# Patient Record
Sex: FEMALE | Employment: OTHER | URBAN - METROPOLITAN AREA
[De-identification: names, ages, dates, MRNs, and addresses within clinical notes are randomized per-mention and may not be internally consistent; named-entity substitution may affect disease eponyms.]

---

## 2024-10-03 ENCOUNTER — APPOINTMENT (OUTPATIENT)
Dept: MRI IMAGING | Age: 59
DRG: 918 | End: 2024-10-03
Payer: MEDICARE

## 2024-10-03 ENCOUNTER — HOSPITAL ENCOUNTER (INPATIENT)
Age: 59
LOS: 5 days | Discharge: NURSING, REHAB OR LONG-TERM CARE | DRG: 918 | End: 2024-10-08
Attending: EMERGENCY MEDICINE | Admitting: HOSPITALIST
Payer: MEDICARE

## 2024-10-03 DIAGNOSIS — F41.9 ANXIETY: ICD-10-CM

## 2024-10-03 DIAGNOSIS — R73.9 HYPERGLYCEMIA: ICD-10-CM

## 2024-10-03 DIAGNOSIS — G93.41 ACUTE METABOLIC ENCEPHALOPATHY: Primary | ICD-10-CM

## 2024-10-03 DIAGNOSIS — R29.90 STROKE-LIKE SYMPTOMS: ICD-10-CM

## 2024-10-03 PROCEDURE — 87040 BLOOD CULTURE FOR BACTERIA: CPT

## 2024-10-03 PROCEDURE — 4A00X4Z MEASUREMENT OF CENTRAL NERVOUS ELECTRICAL ACTIVITY, EXTERNAL APPROACH: ICD-10-PCS | Performed by: STUDENT IN AN ORGANIZED HEALTH CARE EDUCATION/TRAINING PROGRAM

## 2024-10-03 PROCEDURE — 83605 ASSAY OF LACTIC ACID: CPT

## 2024-10-03 PROCEDURE — 85610 PROTHROMBIN TIME: CPT

## 2024-10-03 PROCEDURE — 84484 ASSAY OF TROPONIN QUANT: CPT

## 2024-10-03 PROCEDURE — 99285 EMERGENCY DEPT VISIT HI MDM: CPT

## 2024-10-03 PROCEDURE — 80143 DRUG ASSAY ACETAMINOPHEN: CPT

## 2024-10-03 PROCEDURE — 96374 THER/PROPH/DIAG INJ IV PUSH: CPT

## 2024-10-03 PROCEDURE — 85025 COMPLETE CBC W/AUTO DIFF WBC: CPT

## 2024-10-03 PROCEDURE — 95816 EEG AWAKE AND DROWSY: CPT | Performed by: STUDENT IN AN ORGANIZED HEALTH CARE EDUCATION/TRAINING PROGRAM

## 2024-10-03 PROCEDURE — 95816 EEG AWAKE AND DROWSY: CPT

## 2024-10-03 PROCEDURE — 87154 CUL TYP ID BLD PTHGN 6+ TRGT: CPT

## 2024-10-03 PROCEDURE — 70551 MRI BRAIN STEM W/O DYE: CPT

## 2024-10-03 PROCEDURE — 6360000004 HC RX CONTRAST MEDICATION: Performed by: EMERGENCY MEDICINE

## 2024-10-03 PROCEDURE — 81001 URINALYSIS AUTO W/SCOPE: CPT

## 2024-10-03 PROCEDURE — 80047 BASIC METABLC PNL IONIZED CA: CPT

## 2024-10-03 PROCEDURE — 80053 COMPREHEN METABOLIC PANEL: CPT

## 2024-10-03 PROCEDURE — 87205 SMEAR GRAM STAIN: CPT

## 2024-10-03 PROCEDURE — 1100000003 HC PRIVATE W/ TELEMETRY

## 2024-10-03 PROCEDURE — 82077 ASSAY SPEC XCP UR&BREATH IA: CPT

## 2024-10-03 PROCEDURE — 82962 GLUCOSE BLOOD TEST: CPT

## 2024-10-03 PROCEDURE — 80307 DRUG TEST PRSMV CHEM ANLYZR: CPT

## 2024-10-03 PROCEDURE — 84145 PROCALCITONIN (PCT): CPT

## 2024-10-03 PROCEDURE — 80179 DRUG ASSAY SALICYLATE: CPT

## 2024-10-03 RX ORDER — ASPIRIN 325 MG
TABLET ORAL
Status: DISPENSED
Start: 2024-10-03 | End: 2024-10-03

## 2024-10-03 RX ORDER — SODIUM CHLORIDE 9 MG/ML
INJECTION, SOLUTION INTRAMUSCULAR; INTRAVENOUS; SUBCUTANEOUS
Status: DISPENSED
Start: 2024-10-03 | End: 2024-10-04

## 2024-10-03 RX ORDER — CLOPIDOGREL BISULFATE 75 MG/1
TABLET ORAL
Status: DISPENSED
Start: 2024-10-03 | End: 2024-10-03

## 2024-10-03 RX ORDER — CEFTRIAXONE 1 G/1
INJECTION, POWDER, FOR SOLUTION INTRAMUSCULAR; INTRAVENOUS
Status: DISPENSED
Start: 2024-10-03 | End: 2024-10-04

## 2024-10-03 RX ADMIN — IOPAMIDOL 60 ML: 755 INJECTION, SOLUTION INTRAVENOUS at 10:15
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
## 2024-10-04 ENCOUNTER — APPOINTMENT (OUTPATIENT)
Dept: CT IMAGING | Age: 59
DRG: 918 | End: 2024-10-04
Payer: MEDICARE

## 2024-10-04 ENCOUNTER — APPOINTMENT (OUTPATIENT)
Dept: GENERAL RADIOLOGY | Age: 59
DRG: 918 | End: 2024-10-04
Payer: MEDICARE

## 2024-10-04 PROBLEM — I10 PRIMARY HYPERTENSION: Status: ACTIVE | Noted: 2024-10-04

## 2024-10-04 PROBLEM — E11.9 TYPE 2 DIABETES MELLITUS, WITHOUT LONG-TERM CURRENT USE OF INSULIN (HCC): Status: ACTIVE | Noted: 2024-10-04

## 2024-10-04 PROBLEM — F41.9 ANXIETY: Status: ACTIVE | Noted: 2024-10-04

## 2024-10-04 PROBLEM — F32.A DEPRESSION: Status: ACTIVE | Noted: 2024-10-04

## 2024-10-04 PROBLEM — E78.5 HYPERLIPIDEMIA: Status: ACTIVE | Noted: 2024-10-04

## 2024-10-04 LAB
ACB COMPLEX DNA BLD POS QL NAA+NON-PROBE: NOT DETECTED
ACCESSION NUMBER, LLC1M: ABNORMAL
ALBUMIN SERPL-MCNC: 3 G/DL (ref 3.2–4.6)
ALBUMIN/GLOB SERPL: 1 (ref 1–1.9)
ALP SERPL-CCNC: 100 U/L (ref 35–104)
ALT SERPL-CCNC: 35 U/L (ref 8–45)
AMPHET UR QL SCN: NEGATIVE
ANION GAP BLD CALC-SCNC: 10.4 MMOL/L
ANION GAP SERPL CALC-SCNC: 11 MMOL/L (ref 9–18)
ANION GAP SERPL CALC-SCNC: 11 MMOL/L (ref 9–18)
APPEARANCE UR: CLEAR
AST SERPL-CCNC: 116 U/L (ref 15–37)
B FRAGILIS DNA BLD POS QL NAA+NON-PROBE: NOT DETECTED
B PERT DNA SPEC QL NAA+PROBE: NOT DETECTED
BACTERIA URNS QL MICRO: NEGATIVE /HPF
BARBITURATES UR QL SCN: NEGATIVE
BASOPHILS # BLD: 0 K/UL (ref 0–0.2)
BASOPHILS # BLD: 0.1 K/UL (ref 0–0.2)
BASOPHILS NFR BLD: 0 % (ref 0–2)
BASOPHILS NFR BLD: 0 % (ref 0–2)
BENZODIAZ UR QL: POSITIVE
BILIRUB SERPL-MCNC: 0.3 MG/DL (ref 0–1.2)
BILIRUB UR QL: NEGATIVE
BIOFIRE TEST COMMENT: ABNORMAL
BORDETELLA PARAPERTUSSIS BY PCR: NOT DETECTED
BUN BLD-MCNC: 9 MG/DL (ref 8–26)
BUN SERPL-MCNC: 10 MG/DL (ref 8–23)
BUN SERPL-MCNC: 10 MG/DL (ref 8–23)
C ALBICANS DNA BLD POS QL NAA+NON-PROBE: NOT DETECTED
C AURIS DNA BLD POS QL NAA+NON-PROBE: NOT DETECTED
C GATTII+NEOFOR DNA BLD POS QL NAA+N-PRB: NOT DETECTED
C GLABRATA DNA BLD POS QL NAA+NON-PROBE: NOT DETECTED
C KRUSEI DNA BLD POS QL NAA+NON-PROBE: NOT DETECTED
C PARAP DNA BLD POS QL NAA+NON-PROBE: NOT DETECTED
C PNEUM DNA SPEC QL NAA+PROBE: NOT DETECTED
C TROPICLS DNA BLD POS QL NAA+NON-PROBE: NOT DETECTED
CALCIUM SERPL-MCNC: 8.7 MG/DL (ref 8.8–10.2)
CALCIUM SERPL-MCNC: 8.7 MG/DL (ref 8.8–10.2)
CANNABINOIDS UR QL SCN: NEGATIVE
CHLORIDE BLD-SCNC: 114 MMOL/L (ref 98–107)
CHLORIDE SERPL-SCNC: 105 MMOL/L (ref 98–107)
CHLORIDE SERPL-SCNC: 105 MMOL/L (ref 98–107)
CHOLEST SERPL-MCNC: 124 MG/DL (ref 0–200)
CO2 BLD-SCNC: 18.6 MMOL/L (ref 21–32)
CO2 SERPL-SCNC: 23 MMOL/L (ref 20–28)
CO2 SERPL-SCNC: 23 MMOL/L (ref 20–28)
COCAINE UR QL SCN: NEGATIVE
COLOR UR: ABNORMAL
CREAT BLD-MCNC: 0.45 MG/DL (ref 0.8–1.5)
CREAT SERPL-MCNC: 0.64 MG/DL (ref 0.6–1.1)
CREAT SERPL-MCNC: 0.65 MG/DL (ref 0.6–1.1)
DIFFERENTIAL METHOD BLD: ABNORMAL
DIFFERENTIAL METHOD BLD: ABNORMAL
E CLOAC COMP DNA BLD POS NAA+NON-PROBE: NOT DETECTED
E COLI DNA BLD POS QL NAA+NON-PROBE: NOT DETECTED
E FAECALIS DNA BLD POS QL NAA+NON-PROBE: NOT DETECTED
E FAECIUM DNA BLD POS QL NAA+NON-PROBE: NOT DETECTED
ENTEROBACTERALES DNA BLD POS NAA+N-PRB: NOT DETECTED
EOSINOPHIL # BLD: 0 K/UL (ref 0–0.8)
EOSINOPHIL # BLD: 0.1 K/UL (ref 0–0.8)
EOSINOPHIL NFR BLD: 0 % (ref 0.5–7.8)
EOSINOPHIL NFR BLD: 1 % (ref 0.5–7.8)
EPI CELLS #/AREA URNS HPF: ABNORMAL /HPF
ERYTHROCYTE [DISTWIDTH] IN BLOOD BY AUTOMATED COUNT: 12 % (ref 11.9–14.6)
ERYTHROCYTE [DISTWIDTH] IN BLOOD BY AUTOMATED COUNT: 12.3 % (ref 11.9–14.6)
EST. AVERAGE GLUCOSE BLD GHB EST-MCNC: 222 MG/DL
ETHANOL SERPL-MCNC: <11 MG/DL (ref 0–0.08)
FLUAV SUBTYP SPEC NAA+PROBE: NOT DETECTED
FLUBV RNA SPEC QL NAA+PROBE: NOT DETECTED
GLOBULIN SER CALC-MCNC: 2.9 G/DL (ref 2.3–3.5)
GLUCOSE BLD STRIP.AUTO-MCNC: 144 MG/DL (ref 65–100)
GLUCOSE BLD STRIP.AUTO-MCNC: 180 MG/DL (ref 65–100)
GLUCOSE BLD STRIP.AUTO-MCNC: 190 MG/DL (ref 65–100)
GLUCOSE BLD STRIP.AUTO-MCNC: 190 MG/DL (ref 65–100)
GLUCOSE BLD-MCNC: 241 MG/DL (ref 65–100)
GLUCOSE SERPL-MCNC: 191 MG/DL (ref 70–99)
GLUCOSE SERPL-MCNC: 194 MG/DL (ref 70–99)
GLUCOSE UR STRIP.AUTO-MCNC: >1000 MG/DL
GP B STREP DNA BLD POS QL NAA+NON-PROBE: NOT DETECTED
HADV DNA SPEC QL NAA+PROBE: NOT DETECTED
HAEM INFLU DNA BLD POS QL NAA+NON-PROBE: NOT DETECTED
HBA1C MFR BLD: 9.4 % (ref 0–5.6)
HCOV 229E RNA SPEC QL NAA+PROBE: NOT DETECTED
HCOV HKU1 RNA SPEC QL NAA+PROBE: NOT DETECTED
HCOV NL63 RNA SPEC QL NAA+PROBE: NOT DETECTED
HCOV OC43 RNA SPEC QL NAA+PROBE: NOT DETECTED
HCT VFR BLD AUTO: 40.5 % (ref 35.8–46.3)
HCT VFR BLD AUTO: 45.1 % (ref 35.8–46.3)
HDLC SERPL-MCNC: 42 MG/DL (ref 40–60)
HDLC SERPL: 3 (ref 0–5)
HGB BLD-MCNC: 13.6 G/DL (ref 11.7–15.4)
HGB BLD-MCNC: 15 G/DL (ref 11.7–15.4)
HGB UR QL STRIP: ABNORMAL
HMPV RNA SPEC QL NAA+PROBE: NOT DETECTED
HPIV1 RNA SPEC QL NAA+PROBE: NOT DETECTED
HPIV2 RNA SPEC QL NAA+PROBE: NOT DETECTED
HPIV3 RNA SPEC QL NAA+PROBE: NOT DETECTED
HPIV4 RNA SPEC QL NAA+PROBE: NOT DETECTED
HYALINE CASTS URNS QL MICRO: ABNORMAL /LPF
IMM GRANULOCYTES # BLD AUTO: 0 K/UL (ref 0–0.5)
IMM GRANULOCYTES # BLD AUTO: 0.1 K/UL (ref 0–0.5)
IMM GRANULOCYTES NFR BLD AUTO: 0 % (ref 0–5)
IMM GRANULOCYTES NFR BLD AUTO: 1 % (ref 0–5)
INR PPP: 0.9
K OXYTOCA DNA BLD POS QL NAA+NON-PROBE: NOT DETECTED
KETONES UR QL STRIP.AUTO: 40 MG/DL
KLEBSIELLA SP DNA BLD POS QL NAA+NON-PRB: NOT DETECTED
KLEBSIELLA SP DNA BLD POS QL NAA+NON-PRB: NOT DETECTED
L MONOCYTOG DNA BLD POS QL NAA+NON-PROBE: NOT DETECTED
LACTATE SERPL-SCNC: 2.7 MMOL/L (ref 0.5–2)
LDLC SERPL CALC-MCNC: 53 MG/DL (ref 0–100)
LEUKOCYTE ESTERASE UR QL STRIP.AUTO: NEGATIVE
LYMPHOCYTES # BLD: 1.3 K/UL (ref 0.5–4.6)
LYMPHOCYTES # BLD: 3.9 K/UL (ref 0.5–4.6)
LYMPHOCYTES NFR BLD: 31 % (ref 13–44)
LYMPHOCYTES NFR BLD: 9 % (ref 13–44)
M PNEUMO DNA SPEC QL NAA+PROBE: NOT DETECTED
MCH RBC QN AUTO: 31.6 PG (ref 26.1–32.9)
MCH RBC QN AUTO: 31.6 PG (ref 26.1–32.9)
MCHC RBC AUTO-ENTMCNC: 33.3 G/DL (ref 31.4–35)
MCHC RBC AUTO-ENTMCNC: 33.6 G/DL (ref 31.4–35)
MCV RBC AUTO: 94.2 FL (ref 82–102)
MCV RBC AUTO: 94.9 FL (ref 82–102)
MECA+MECC ISLT/SPM QL: NOT DETECTED
METHADONE UR QL: NEGATIVE
MONOCYTES # BLD: 0.7 K/UL (ref 0.1–1.3)
MONOCYTES # BLD: 0.7 K/UL (ref 0.1–1.3)
MONOCYTES NFR BLD: 5 % (ref 4–12)
MONOCYTES NFR BLD: 6 % (ref 4–12)
N MEN DNA BLD POS QL NAA+NON-PROBE: NOT DETECTED
NEUTS SEG # BLD: 12.5 K/UL (ref 1.7–8.2)
NEUTS SEG # BLD: 7.7 K/UL (ref 1.7–8.2)
NEUTS SEG NFR BLD: 62 % (ref 43–78)
NEUTS SEG NFR BLD: 85 % (ref 43–78)
NITRITE UR QL STRIP.AUTO: NEGATIVE
NRBC # BLD: 0 K/UL (ref 0–0.2)
NRBC # BLD: 0 K/UL (ref 0–0.2)
OPIATES UR QL: NEGATIVE
P AERUGINOSA DNA BLD POS NAA+NON-PROBE: NOT DETECTED
PCP UR QL: NEGATIVE
PH UR STRIP: 5.5 (ref 5–9)
PLATELET # BLD AUTO: 274 K/UL (ref 150–450)
PLATELET # BLD AUTO: 302 K/UL (ref 150–450)
PMV BLD AUTO: 10.4 FL (ref 9.4–12.3)
PMV BLD AUTO: 10.4 FL (ref 9.4–12.3)
POTASSIUM BLD-SCNC: 3.5 MMOL/L (ref 3.5–5.1)
POTASSIUM SERPL-SCNC: 3.8 MMOL/L (ref 3.5–5.1)
POTASSIUM SERPL-SCNC: 4 MMOL/L (ref 3.5–5.1)
PROCALCITONIN SERPL-MCNC: 0.12 NG/ML (ref 0–0.1)
PROT SERPL-MCNC: 5.9 G/DL (ref 6.3–8.2)
PROT UR STRIP-MCNC: ABNORMAL MG/DL
PROTEUS SP DNA BLD POS QL NAA+NON-PROBE: NOT DETECTED
PROTHROMBIN TIME: 13.1 SEC (ref 11.3–14.9)
RBC # BLD AUTO: 4.3 M/UL (ref 4.05–5.2)
RBC # BLD AUTO: 4.75 M/UL (ref 4.05–5.2)
RBC #/AREA URNS HPF: ABNORMAL /HPF
RESISTANT GENE TARGETS: ABNORMAL
RSV RNA SPEC QL NAA+PROBE: NOT DETECTED
RV+EV RNA SPEC QL NAA+PROBE: NOT DETECTED
S AUREUS DNA BLD POS QL NAA+NON-PROBE: NOT DETECTED
S AUREUS+CONS DNA BLD POS NAA+NON-PROBE: DETECTED
S EPIDERMIDIS DNA BLD POS QL NAA+NON-PRB: DETECTED
S LUGDUNENSIS DNA BLD POS QL NAA+NON-PRB: NOT DETECTED
S MALTOPHILIA DNA BLD POS QL NAA+NON-PRB: NOT DETECTED
S MARCESCENS DNA BLD POS NAA+NON-PROBE: NOT DETECTED
S PNEUM DNA BLD POS QL NAA+NON-PROBE: NOT DETECTED
S PYO DNA BLD POS QL NAA+NON-PROBE: NOT DETECTED
SALMONELLA DNA BLD POS QL NAA+NON-PROBE: NOT DETECTED
SARS-COV-2 RNA RESP QL NAA+PROBE: NOT DETECTED
SERVICE CMNT-IMP: ABNORMAL
SODIUM BLD-SCNC: 143 MMOL/L (ref 136–145)
SODIUM SERPL-SCNC: 139 MMOL/L (ref 136–145)
SODIUM SERPL-SCNC: 139 MMOL/L (ref 136–145)
SP GR UR REFRACTOMETRY: >1.035 (ref 1–1.02)
STREPTOCOCCUS DNA BLD POS NAA+NON-PROBE: NOT DETECTED
TRIGL SERPL-MCNC: 144 MG/DL (ref 0–150)
TROPONIN T SERPL HS-MCNC: 12 NG/L (ref 0–14)
UROBILINOGEN UR QL STRIP.AUTO: 0.2 EU/DL (ref 0.2–1)
VLDLC SERPL CALC-MCNC: 29 MG/DL (ref 6–23)
WBC # BLD AUTO: 12.5 K/UL (ref 4.3–11.1)
WBC # BLD AUTO: 14.6 K/UL (ref 4.3–11.1)
WBC URNS QL MICRO: ABNORMAL /HPF

## 2024-10-04 PROCEDURE — 51798 US URINE CAPACITY MEASURE: CPT

## 2024-10-04 PROCEDURE — 70498 CT ANGIOGRAPHY NECK: CPT

## 2024-10-04 PROCEDURE — 80307 DRUG TEST PRSMV CHEM ANLYZR: CPT

## 2024-10-04 PROCEDURE — 97535 SELF CARE MNGMENT TRAINING: CPT

## 2024-10-04 PROCEDURE — 51701 INSERT BLADDER CATHETER: CPT

## 2024-10-04 PROCEDURE — 97165 OT EVAL LOW COMPLEX 30 MIN: CPT

## 2024-10-04 PROCEDURE — 92523 SPEECH SOUND LANG COMPREHEN: CPT

## 2024-10-04 PROCEDURE — 73030 X-RAY EXAM OF SHOULDER: CPT

## 2024-10-04 PROCEDURE — 92610 EVALUATE SWALLOWING FUNCTION: CPT

## 2024-10-04 PROCEDURE — 2580000003 HC RX 258: Performed by: HOSPITALIST

## 2024-10-04 PROCEDURE — 0202U NFCT DS 22 TRGT SARS-COV-2: CPT

## 2024-10-04 PROCEDURE — 73060 X-RAY EXAM OF HUMERUS: CPT

## 2024-10-04 PROCEDURE — 80061 LIPID PANEL: CPT

## 2024-10-04 PROCEDURE — 97530 THERAPEUTIC ACTIVITIES: CPT

## 2024-10-04 PROCEDURE — 2500000003 HC RX 250 WO HCPCS: Performed by: HOSPITALIST

## 2024-10-04 PROCEDURE — 70450 CT HEAD/BRAIN W/O DYE: CPT

## 2024-10-04 PROCEDURE — 80053 COMPREHEN METABOLIC PANEL: CPT

## 2024-10-04 PROCEDURE — 85025 COMPLETE CBC W/AUTO DIFF WBC: CPT

## 2024-10-04 PROCEDURE — 1100000003 HC PRIVATE W/ TELEMETRY

## 2024-10-04 PROCEDURE — 70496 CT ANGIOGRAPHY HEAD: CPT

## 2024-10-04 PROCEDURE — 36415 COLL VENOUS BLD VENIPUNCTURE: CPT

## 2024-10-04 PROCEDURE — 83036 HEMOGLOBIN GLYCOSYLATED A1C: CPT

## 2024-10-04 PROCEDURE — 97161 PT EVAL LOW COMPLEX 20 MIN: CPT

## 2024-10-04 PROCEDURE — 6370000000 HC RX 637 (ALT 250 FOR IP): Performed by: HOSPITALIST

## 2024-10-04 PROCEDURE — 82962 GLUCOSE BLOOD TEST: CPT

## 2024-10-04 PROCEDURE — 99221 1ST HOSP IP/OBS SF/LOW 40: CPT | Performed by: STUDENT IN AN ORGANIZED HEALTH CARE EDUCATION/TRAINING PROGRAM

## 2024-10-04 RX ORDER — LANOLIN ALCOHOL/MO/W.PET/CERES
100 CREAM (GRAM) TOPICAL DAILY
Status: DISCONTINUED | OUTPATIENT
Start: 2024-10-04 | End: 2024-10-08 | Stop reason: HOSPADM

## 2024-10-04 RX ORDER — LOSARTAN POTASSIUM 25 MG/1
25 TABLET ORAL DAILY
Status: DISCONTINUED | OUTPATIENT
Start: 2024-10-04 | End: 2024-10-08 | Stop reason: HOSPADM

## 2024-10-04 RX ORDER — ASPIRIN 81 MG/1
81 TABLET, CHEWABLE ORAL DAILY
Status: DISCONTINUED | OUTPATIENT
Start: 2024-10-04 | End: 2024-10-04

## 2024-10-04 RX ORDER — INSULIN GLARGINE 100 [IU]/ML
5 INJECTION, SOLUTION SUBCUTANEOUS NIGHTLY
Status: DISCONTINUED | OUTPATIENT
Start: 2024-10-04 | End: 2024-10-08 | Stop reason: HOSPADM

## 2024-10-04 RX ORDER — INSULIN LISPRO 100 [IU]/ML
0-4 INJECTION, SOLUTION INTRAVENOUS; SUBCUTANEOUS NIGHTLY
Status: DISCONTINUED | OUTPATIENT
Start: 2024-10-04 | End: 2024-10-08 | Stop reason: HOSPADM

## 2024-10-04 RX ORDER — ATORVASTATIN CALCIUM 80 MG/1
80 TABLET, FILM COATED ORAL NIGHTLY
Status: DISCONTINUED | OUTPATIENT
Start: 2024-10-04 | End: 2024-10-08 | Stop reason: HOSPADM

## 2024-10-04 RX ORDER — NICOTINE 21 MG/24HR
1 PATCH, TRANSDERMAL 24 HOURS TRANSDERMAL DAILY
Status: DISCONTINUED | OUTPATIENT
Start: 2024-10-04 | End: 2024-10-08 | Stop reason: HOSPADM

## 2024-10-04 RX ORDER — IOPAMIDOL 755 MG/ML
60 INJECTION, SOLUTION INTRAVASCULAR
Status: COMPLETED | OUTPATIENT
Start: 2024-10-04 | End: 2024-10-03

## 2024-10-04 RX ORDER — PRAMIPEXOLE DIHYDROCHLORIDE 0.25 MG/1
0.25 TABLET ORAL NIGHTLY
COMMUNITY

## 2024-10-04 RX ORDER — HYDROXYZINE HYDROCHLORIDE 25 MG/1
25 TABLET, FILM COATED ORAL EVERY 4 HOURS PRN
Status: ON HOLD | COMMUNITY
End: 2024-10-07 | Stop reason: HOSPADM

## 2024-10-04 RX ORDER — ENOXAPARIN SODIUM 100 MG/ML
40 INJECTION SUBCUTANEOUS EVERY 24 HOURS
Status: DISCONTINUED | OUTPATIENT
Start: 2024-10-04 | End: 2024-10-08 | Stop reason: HOSPADM

## 2024-10-04 RX ORDER — LABETALOL HYDROCHLORIDE 5 MG/ML
10 INJECTION, SOLUTION INTRAVENOUS EVERY 6 HOURS PRN
Status: DISCONTINUED | OUTPATIENT
Start: 2024-10-04 | End: 2024-10-08 | Stop reason: HOSPADM

## 2024-10-04 RX ORDER — IRBESARTAN 75 MG/1
75 TABLET ORAL NIGHTLY
COMMUNITY

## 2024-10-04 RX ORDER — ATORVASTATIN CALCIUM 40 MG/1
40 TABLET, FILM COATED ORAL DAILY
COMMUNITY

## 2024-10-04 RX ORDER — PROMETHAZINE HYDROCHLORIDE 25 MG/1
25 TABLET ORAL EVERY 6 HOURS PRN
Status: DISCONTINUED | OUTPATIENT
Start: 2024-10-04 | End: 2024-10-08 | Stop reason: HOSPADM

## 2024-10-04 RX ORDER — SODIUM CHLORIDE, SODIUM LACTATE, POTASSIUM CHLORIDE, CALCIUM CHLORIDE 600; 310; 30; 20 MG/100ML; MG/100ML; MG/100ML; MG/100ML
INJECTION, SOLUTION INTRAVENOUS CONTINUOUS
Status: DISCONTINUED | OUTPATIENT
Start: 2024-10-04 | End: 2024-10-04

## 2024-10-04 RX ORDER — ASPIRIN 325 MG
325 TABLET, DELAYED RELEASE (ENTERIC COATED) ORAL ONCE
Status: DISCONTINUED | OUTPATIENT
Start: 2024-10-03 | End: 2024-10-04

## 2024-10-04 RX ORDER — BUSPIRONE HYDROCHLORIDE 5 MG/1
5 TABLET ORAL 3 TIMES DAILY
COMMUNITY

## 2024-10-04 RX ORDER — CLOPIDOGREL BISULFATE 75 MG/1
300 TABLET ORAL ONCE
Status: DISCONTINUED | OUTPATIENT
Start: 2024-10-03 | End: 2024-10-04

## 2024-10-04 RX ORDER — CLOPIDOGREL BISULFATE 75 MG/1
75 TABLET ORAL DAILY
Status: DISCONTINUED | OUTPATIENT
Start: 2024-10-04 | End: 2024-10-04

## 2024-10-04 RX ORDER — TRAMADOL HYDROCHLORIDE 50 MG/1
50 TABLET ORAL EVERY 6 HOURS PRN
Status: DISCONTINUED | OUTPATIENT
Start: 2024-10-04 | End: 2024-10-08 | Stop reason: HOSPADM

## 2024-10-04 RX ORDER — ACETAMINOPHEN 325 MG/1
650 TABLET ORAL EVERY 6 HOURS PRN
Status: DISCONTINUED | OUTPATIENT
Start: 2024-10-04 | End: 2024-10-08 | Stop reason: HOSPADM

## 2024-10-04 RX ORDER — FOLIC ACID 1 MG/1
1 TABLET ORAL DAILY
Status: DISCONTINUED | OUTPATIENT
Start: 2024-10-04 | End: 2024-10-08 | Stop reason: HOSPADM

## 2024-10-04 RX ORDER — INSULIN LISPRO 100 [IU]/ML
0-4 INJECTION, SOLUTION INTRAVENOUS; SUBCUTANEOUS
Status: DISCONTINUED | OUTPATIENT
Start: 2024-10-04 | End: 2024-10-08 | Stop reason: HOSPADM

## 2024-10-04 RX ORDER — ONDANSETRON 2 MG/ML
4 INJECTION INTRAMUSCULAR; INTRAVENOUS EVERY 4 HOURS PRN
Status: DISCONTINUED | OUTPATIENT
Start: 2024-10-04 | End: 2024-10-08 | Stop reason: HOSPADM

## 2024-10-04 RX ADMIN — SODIUM CHLORIDE, PRESERVATIVE FREE 20 MG: 5 INJECTION INTRAVENOUS at 21:08

## 2024-10-04 RX ADMIN — Medication 100 MG: at 08:25

## 2024-10-04 RX ADMIN — INSULIN GLARGINE 5 UNITS: 100 INJECTION, SOLUTION SUBCUTANEOUS at 21:07

## 2024-10-04 RX ADMIN — SODIUM CHLORIDE, PRESERVATIVE FREE 20 MG: 5 INJECTION INTRAVENOUS at 12:28

## 2024-10-04 RX ADMIN — ATORVASTATIN CALCIUM 80 MG: 80 TABLET, FILM COATED ORAL at 21:08

## 2024-10-04 RX ADMIN — LOSARTAN POTASSIUM 25 MG: 25 TABLET, FILM COATED ORAL at 08:29

## 2024-10-04 RX ADMIN — ASPIRIN 81 MG 81 MG: 81 TABLET ORAL at 08:29

## 2024-10-04 RX ADMIN — SODIUM CHLORIDE, SODIUM LACTATE, POTASSIUM CHLORIDE, AND CALCIUM CHLORIDE: 600; 310; 30; 20 INJECTION, SOLUTION INTRAVENOUS at 07:00

## 2024-10-04 RX ADMIN — FOLIC ACID 1 MG: 1 TABLET ORAL at 08:26

## 2024-10-04 RX ADMIN — TRAMADOL HYDROCHLORIDE 50 MG: 50 TABLET ORAL at 08:27

## 2024-10-04 RX ADMIN — TRAMADOL HYDROCHLORIDE 50 MG: 50 TABLET ORAL at 21:11

## 2024-10-04 ASSESSMENT — PAIN DESCRIPTION - LOCATION
LOCATION: SHOULDER
LOCATION: SHOULDER
LOCATION: GENERALIZED;SHOULDER

## 2024-10-04 ASSESSMENT — PAIN SCALES - GENERAL
PAINLEVEL_OUTOF10: 8
PAINLEVEL_OUTOF10: 0
PAINLEVEL_OUTOF10: 3
PAINLEVEL_OUTOF10: 9
PAINLEVEL_OUTOF10: 4
PAINLEVEL_OUTOF10: 6
PAINLEVEL_OUTOF10: 0

## 2024-10-04 ASSESSMENT — PAIN DESCRIPTION - ORIENTATION
ORIENTATION: RIGHT

## 2024-10-04 ASSESSMENT — PAIN DESCRIPTION - DESCRIPTORS
DESCRIPTORS: SORE
DESCRIPTORS: SHOOTING
DESCRIPTORS: THROBBING;SHOOTING

## 2024-10-04 NOTE — PROGRESS NOTES
ACUTE OCCUPATIONAL THERAPY GOALS:   (Developed with and agreed upon by patient and/or caregiver.)  1. Patient will complete lower body bathing and dressing with MODIFIED INDEPENDENCE and adaptive equipment as needed.   2. Patient will complete toileting with MODIFIED INDEPENDENCE.   3. Patient will tolerate 30 minutes of OT treatment with 1-2 rest breaks to increase activity tolerance for ADLs.   4. Patient will complete functional transfers with MODIFIED INDEPENDENCE and adaptive equipment as needed.   5. Patient will complete functional mobility for household distances with MODIFIED INDEPENDENCE and adaptive equipment as needed.  6. Patient will complete self-grooming while standing edge of sink with MODIFIED INDEPENDENCE and adaptive equipment as needed.    Timeframe: 7 visits       OCCUPATIONAL THERAPY Initial Assessment, Daily Note, and AM       OT Visit Days: 1   Acknowledge Orders  Time  OT Charge Capture  Rehab Caseload Tracker      Carmela Mcgowan is a 59 y.o. female   PRIMARY DIAGNOSIS: Stroke-like symptoms  Stroke-like symptoms [R29.90]       Reason for Referral: Generalized Muscle Weakness (M62.81)  Other lack of cordination (R27.8)  Difficulty in walking, Not elsewhere classified (R26.2)  Inpatient: Payor: [facility] MEDICARE / Plan: [facility] MEDICARE COMPLETE / Product Type: *No Product type* /     ASSESSMENT:     REHAB RECOMMENDATIONS:   Recommendation to date pending progress:  Setting:  Inpatient Rehab Facility     Equipment:    To Be Determined     ASSESSMENT:  Ms. Mcgowan presents with deficits in overall strength, activity tolerance, activities of daily living performance, and functional mobility. Presents for CVA workup after patient found semi-unresponsive by sister. Also reports falling on RUE leading up to coming to hospital. Of note, pt lives with sister however 16 steps to manage to living accommodations; Independent with ADLs and functional mobility.        Today, RUE AROM and strength are grossly  assessment, interpretation of assessment, and skilled monitoring of the patient's response to treatment in order to develop a plan of care.     TREATMENT:   Co-Treatment PT/OT necessary due to patient's decreased overall endurance/tolerance levels, as well as need for high level skilled assistance to complete functional transfers/mobility and functional tasks  Self Care (15 minutes): Patient participated in toileting and upper body dressing ADLs in unsupported sitting with minimal verbal and tactile cueing to increase independence, decrease assistance required, and increase activity tolerance. Patient also participated in bed mobility, functional mobility, functional transfer, bathroom mobility, energy conservation, and assistive device training to increase independence, decrease assistance required, increase activity tolerance, and increase safety awareness. The patient and family was educated on role of occupational therapy, compensatory technique during ADL , strategies to improve safety, proper use of assistive device, and transfer training and safety and patient and family verbalized understanding and demonstrated understanding.     TREATMENT GRID:  N/A    AFTER TREATMENT PRECAUTIONS: Bed, Bed/Chair Locked, Call light within reach, Needs within reach, RN notified, and Visitors at bedside    INTERDISCIPLINARY COLLABORATION:  RN/ PCT, PT/ PTA, and OT/ DOUGLASS    EDUCATION:  Education Given To: Patient  Education Provided: Role of Therapy;Plan of Care  Barriers to Learning: None  Education Outcome: Verbalized understanding;Demonstrated understanding      TOTAL TREATMENT DURATION AND TIME:  Time In: 1111  Time Out: 1130  Minutes: 19      Yolanda Kearns, OT

## 2024-10-04 NOTE — PLAN OF CARE
Problem: Discharge Planning  Goal: Discharge to home or other facility with appropriate resources  Outcome: Progressing  Flowsheets (Taken 10/4/2024 0720)  Discharge to home or other facility with appropriate resources:   Identify barriers to discharge with patient and caregiver   Arrange for needed discharge resources and transportation as appropriate   Identify discharge learning needs (meds, wound care, etc)   Refer to discharge planning if patient needs post-hospital services based on physician order or complex needs related to functional status, cognitive ability or social support system     Problem: Pain  Goal: Verbalizes/displays adequate comfort level or baseline comfort level  Outcome: Progressing     Problem: Safety - Adult  Goal: Free from fall injury  Outcome: Progressing

## 2024-10-04 NOTE — CONSULTS
[facility] PM&R/ Acute Rehab Referral    Chart reviewed-    Briefly, 58yo F admitted after fall, initially concern for arm weakness or stroke like symptoms. Brain MRI negative. No arm fractures. Now fall is suspected secondary to drug overdose (hydroxyzine?).  She is pending Psych consult. No other medical needs per discussion with Dr. Alas.    PT/OT rec IRF    Independent at baseline  Now amb 80ft CGA with RW with generalized LE weakness    16 leo home    Co-morbidities:  MDD    Impression:  //Gait and Self-care deficits secondary to ?fall, shoulder injury  //Decreased independence with ADLs    Denied for inpatient rehab services  -Patient does not appear to meet diagnostic criteria for acute rehabilitation (?debility secondary to drug overdose, fall?) nor is expected to have ongoing medical management needs to justify acute rehabilitation.    Above communicated to Dr. Alas and JOHNIE Mccallum.    Thank you for this consult.    Daniele Araiza MD  PM&R Attending Inpatient Physician  October 4, 2024

## 2024-10-04 NOTE — CARE COORDINATION
LOS 1d    Discussed in IDT and chart reviewed for continued stay and RACHAEL.  Awaiting psych consult  PT/OT recommending IPR. Referral sent .  Will continue to follow and make referrals as needed.    Update 1600: Spoke with Dr Araiza, pt is not a candidate for IPR. Dr. Alas aware.

## 2024-10-04 NOTE — CONSULTS
Neurology Consult Note       History:   59-year-old female who is being seen for code S. The patient was last known normal last evening before she went to bed.  Next to her bed this morning minimally responsive with empty pill bottles nearby.  Code S called in the ER as she was not moving the right arm as much as the left side. The patient presented to [facility] ED.  A code S was called at 0930. Neurology arrived to the bedside at 0932. Initial NIHSS was 5.       Exam: Pertinent positives and negatives include:  NIHSS Score: 5  1a-Level of Consciousness 1  1b-What is Month/Age 1  1c-Open/Close Eyes&Hand 0  2 -Best Gaze 0  3 -Visual Fields 0  4 -Facial Palsy 0  5a-Motor-Left Arm 0  5b-Motor-Right Arm 2  6a-Motor-Left Leg 0  6b-Motor-Right Leg 0  7 -Limb Ataxia 0  8 -Sensory 0  9 -Best Language 0  10-Dysarthria 1  11-Extinction/Inattention 0    Giveaway weakness in the right arm.  Muscle tone is present.    Imaging and review of data:   MRI brain is normal.  CT angiogram is unremarkable.    EEG without seizures or interictal abnormalities      Assessment:     59-year-old female seen as a code S with confusion and right arm weakness in the setting of possible overdose. Initial NIHSS was 5. The patient was not a candidate for tenecteplase because she is outside the window . 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     Plan:     No indication for antiplatelets from neurology perspective.  Agree with psychiatry consultation and evaluation.  Continue medical workup and treatment.  Neurology will sign off.      Brannon Bustillo, DO  Neurology      I spent 40 minutes today with the patient, which included chart review, obtaining history from the patient/family/other providers, physical exam, documentation, and reviewing pertinent testing.

## 2024-10-04 NOTE — PROGRESS NOTES
Patient had not urinated to this nurses knowledge. Patient assisted to the bathroom by this nurse with the aide of rolling walker. Patient attempted to urinate but unable. [place]an left in toilet. Bladder scan completed, showed 169 mL urine in bladder. Patient thinks she may have urinated when PT/OT walked her around. Confirmed with rehab services. All values placed in chart at appropriate times. Patient has voided, unmeasured void during this shift.

## 2024-10-04 NOTE — PROCEDURES
Routine EEG Report    Reason for EEG: Encephalopathy    Technical Summary: This EEG was performed with a 32-channel digital EEG machine with electrodes placed according to the international 10-20 system of placement.            Background:   During the maximal awake state non-sustained posterior dominant rhythm was seen.  Anterior background consisted of medium amplitude activity in the primarily in the theta range with occasional intermixed alpha frequencies.    Hyperventilation: Hyperventilation was not performed due to medical condition    Photic Stimulation: Photic stimulation was not performed due to medical condition      Impression: The results of this EEG are abnormal.  There is slowing of the background consistent with a mild degree of encephalopathy, nonspecific in origin.  There are no lateralizing features or epileptiform discharges.    Brannon Bustillo, DO  Neurology

## 2024-10-04 NOTE — PROGRESS NOTES
SPEECH LANGUAGE PATHOLOGY: COMBINED Dysphagia and Cognitive Communication Initial Assessment and Discharge    Acknowledge Order  I  Therapy Time  I   Charges     I  Rehab Caseload Tracker    NAME: Carmela Mcgowan  : 1965  MRN: [id number]    ADMISSION DATE: 10/3/2024  PRIMARY DIAGNOSIS: Stroke-like symptoms    ICD-10: Treatment Diagnosis: R13.11 Dysphagia, Oral Phase  R41.841 Cognitive-Communication Deficit    RECOMMENDATIONS   Diet:    Regular Consistency  Thin Liquids    Medication: as tolerated   Compensatory Swallowing Strategies:   Upright as possible for all oral intake   Therapeutic Intervention:   Patient/family education  Dysphagia treatment  Cognitive-linguistic treatment  No additional speech therapy intervention indicated at this time.    Patient continues to require skilled intervention:  No. Please re-consult if new concerns arise.      Anticipated Discharge Needs: No additional speech therapy is indicated.      ASSESSMENT    Dysphagia: Patient presents with functional swallow as able to be determined at bedside characterized by functional oral prep, timely oral transit, and no overt indications of airway compromise.    Cog: appropriate immediate and delayed recall, oriented x4, expressive and receptive language abilities intact.     Recommend regular solids, thin liquids, medications as tolerated. No further speech therapy indicated.     GENERAL    Subjective: Lying in bed, easily roused, no family at bedside.     Recent Information/Background: Patient with CVA symptoms or possible OD on home medications presenting with CVA symptoms.    History of Present Injury/Illness: Ms. Mcgowan  has no past medical history on file.. She also  has no past surgical history on file.  Precautions/Allergies: Patient has no known allergies.     Observations:  Alertness: Alert  Voice: WFL  Speech: Intelligible  Expressive Language: Fluent and Able to communicate wants and needs  Receptive Language: Answers          Dysphagia Outcome and Severity Scale (ULICES)  Score: 7 Description   [x] 7 Normal in all situations   [] 6 Within Functional Limits/ Modified independent   [] 5 Mild Dysphagia: Distant Supervision. May need one diet consistency      restricted.   [] 4 Mild-Moderate Dysphagia: Intermittent supervision/cuing. One-two diet    consistencies restricted.   [] 3 Moderate Dysphagia: Total assistance, supervision, or strategies.       Two or more diet consistencies restricted.   [] 2 Moderate-Severe Dysphagia: Maximum assistance or maximum use     of strategies with partial po intake   [] 1 Severe dysphagia- NPO. Unable to tolerate any po safely     MODIFIED VIKI SCALE (mRS) SCORE:   Score: 1 Description   [] 0  No Symptoms   [x] 1 No significant disability despite symptoms; Able to carry out all usual duties and activities.   [] 2 Slight Disability: Unable to carry out all previous activities, but able to look after own affairs without assistance.    [] 3 Moderate Disability Requires some help, but is able to walk without assistance.   [] 4 Moderately Severe Disability Unable to walk without assistance and unable to attend to own bodily needs without assistance.    [] 5 Severe disability Bedridden, incontinent, and requiring constant nursing care and attention.        PLAN    Duration/Frequency: No treatment indicated at this time    Rehabilitation Potential For Stated Goals: Good    Interdisciplinary Collaboration: RN/ PCT    Medical Necessity    No additional speech therapy indicated at this time.     Education:   Patient educated on Results of evaluation, Speech therapy recommendations, Role of speech therapy, SLP plan, and Diet recommendations  Education provided to Patient and MD  Education response: Verbalizes understanding    Safety:   Call light within reach  In Bed  RN notified     Therapy Time:  Time In: 1013  Time Out: 1034  Minutes: 21    Salma Kaufman M.S., CCC-SLP   10/4/2024 10:36 AM

## 2024-10-04 NOTE — THERAPY EVALUATION
ACUTE PHYSICAL THERAPY GOALS:   (Developed with and agreed upon by patient and/or caregiver.)    (1.) Carmela Mcgowan  will move from supine to sit and sit to supine  and scoot up and down with INDEPENDENCE within 7 treatment day(s).    (2.) Carmela Mcgowan will transfer from bed to chair and chair to bed with MODIFIED INDEPENDENCE using the least restrictive device within 7 treatment day(s).    (3.) Carmela Mcgowan will ambulate with MODIFIED INDEPENDENCE for 500 feet with the least restrictive device within 7 treatment day(s).   (4.) Carmela Mcgowan will perform standing static and dynamic balance activities x 10 minutes with MODIFIED INDEPENDENCE to improve safety within 7 treatment day(s).  (5.) Cramela Mcgowan will ascend and descend flight of stairs using 1 hand rail(s) with MODIFIED INDEPENDENCE to improve functional mobility and safety within 7 treatment day(s).    PHYSICAL THERAPY Initial Assessment, Daily Note, and AM  (Link to Caseload Tracking: PT Visit Days : 1  Acknowledge Orders  Time In/Out  PT Charge Capture  Rehab Caseload Tracker    Carmela Mcgowan is a 59 y.o. female   PRIMARY DIAGNOSIS: Stroke-like symptoms  Stroke-like symptoms [R29.90]       Reason for Referral: Generalized Muscle Weakness (M62.81)  Other lack of cordination (R27.8)  Difficulty in walking, Not elsewhere classified (R26.2)  Inpatient: Payor: [facility] MEDICARE / Plan: [facility] MEDICARE COMPLETE / Product Type: *No Product type* /     ASSESSMENT:     REHAB RECOMMENDATIONS:   Recommendation to date pending progress:  Setting:  Inpatient Rehab Facility    Equipment:    To Be Determined     ASSESSMENT:  Ms. Mcgowan is a 59 year old female who presents to [facility] with stroke like symptoms. Pt c/o R UE pain and immobility likely related to fall from bed landing on R side. Pt diagnosed with OD vs functional neurological disorder. At baseline, pt is independent with all functional mobility and ADLs. This date pt performs mobility including bed  unsteady    Weightbearing Status Restrictions/Precautions  Restrictions/Precautions: Fall Risk    Stairs NT     I=Independent, Mod I=Modified Independent, S=Supervision, SBA=Standby Assistance, CGA=Contact Guard Assistance,   Min=Minimal Assistance, Mod=Moderate Assistance, Max=Maximal Assistance, Total=Total Assistance, NT=Not Tested    PLAN:   FREQUENCY AND DURATION: 3 times/week for duration of hospital stay or until stated goals are met, whichever comes first.    THERAPY PROGNOSIS: Good    PROBLEM LIST:   (Skilled intervention is medically necessary to address:)  Decreased ADL/Functional Activities  Decreased Activity Tolerance  Decreased Balance  Decreased Coordination  Decreased Gait Ability  Decreased Strength  Decreased Transfer Abilities  Increased Pain INTERVENTIONS PLANNED:   (Benefits and precautions of physical therapy have been discussed with the patient.)  Self Care Training  Therapeutic Activity  Therapeutic Exercise/HEP  Neuromuscular Re-education  Gait Training  Education       TREATMENT:   EVALUATION: LOW COMPLEXITY: (Untimed Charge)  The initial evaluation charge encompasses clinical chart review, objective assessment, interpretation of assessment, and skilled monitoring of the patient's response to treatment in order to develop a plan of care.     TREATMENT:   Co-Treatment PT/OT necessary due to patient's decreased overall endurance/tolerance levels, as well as need for high level skilled assistance to complete functional transfers/mobility and functional tasks  Therapeutic Activity (15 Minutes): Therapeutic activity included Rolling, Supine to Sit, Sit to Supine, Scooting, Transfer Training, Ambulation on level ground, Sitting balance , and Standing balance to improve functional Activity tolerance, Balance, Coordination, Mobility, and Strength.    TREATMENT GRID:  N/A    AFTER TREATMENT PRECAUTIONS: Bed, Bed/Chair Locked, Call light within reach, Needs within reach, Side rails x3, and Visitors

## 2024-10-04 NOTE — PROGRESS NOTES
Hospitalist Progress Note   Admit Date:  10/3/2024  2:56 PM   Name:  Carmela Mcgowan   Age:  59 y.o.  Sex:  female  :  1965   MRN:  [id number]   Room:  810/    Presenting/Chief Complaint: No chief complaint on file.     Reason(s) for Admission: Stroke-like symptoms [R29.90]     Hospital Course:   Carmela Mcgowan is a 59 y.o. female with medical history of HTN, depression, anxiety, DM presents to ED via EMS after she was found down on the floor by her bed. Per EMS, Sister reports patient had her medication scattered on the floor, unable to identify which medication. ER called code stroke because patient was not moving the right arm as much as her left.  Initial NIHSS was 5. Brain MRI showed no acute ischemia and CTA was unremarkable. EEG was negative for seizures. Neurology signed off.     Upon further questioning, patient reports that she took some sleeping medication that started with hydro.  UDS showed positivity for benzodiazepines.  Patient states that she takes this medication for anxiety and we are suspecting it may likely be hydroxyzine.  Patient felt dizzy prior to her fall but does not remember exact events.      Upon arrival to the floor, she appears to be back at her baseline.  Imaging of shoulders and right arm show no evidence of acute fracture or dislocation.  Currently denies any suicidal or homicidal ideation.  Psych consult is pending      Subjective & 24hr Events:   Patient was seen and evaluated at bedside this morning.  States that she is having some right arm pain.  Denies any fevers, chills, headaches, nausea, vomiting.  Currently back to her baseline mental status.      Assessment & Plan:     Right arm pain and weakness secondary to fall and trauma  Overdose on medications  Stroke ruled out given negative MRI.  Patient having right arm weakness due to pain from trauma.  X-rays show no acute fracture or dislocation.  PT/OT eval noted and recommending inpatient rehab.  Currently  pyogenes,(Grp. A) NOT DETECTED NOTDET      Acinetobacter calcoac baumannii complex by PCR NOT DETECTED NOTDET      Bacteroides fragilis by PCR NOT DETECTED NOTDET      Enterobacteriaceae by PCR NOT DETECTED NOTDET      Enterobacter cloacae complex by PCR NOT DETECTED NOTDET      Escherichia Coli NOT DETECTED NOTDET      Klebsiella aerogenes by PCR NOT DETECTED NOTDET      Klebsiella oxytoca by PCR NOT DETECTED NOTDET      Klebsiella pneumoniae group by PCR NOT DETECTED NOTDET      Proteus by PCR NOT DETECTED NOTDET      Salmonella species by PCR NOT DETECTED NOTDET      Serratia marcescens by PCR NOT DETECTED NOTDET      Haemophilus Influenzae by PCR NOT DETECTED NOTDET      Neisseria meningitidis by PCR NOT DETECTED NOTDET      Pseudomonas aeruginosa NOT DETECTED NOTDET      Stenotrophomonas maltophilia by PCR NOT DETECTED NOTDET      Candida albicans by PCR NOT DETECTED NOTDET      Candida auris by PCR NOT DETECTED NOTDET      Candida glabrata NOT DETECTED NOTDET      Candida krusei by PCR NOT DETECTED NOTDET      Candida parapsilosis by PCR NOT DETECTED NOTDET      Candida tropicalis by PCR NOT DETECTED NOTDET      Cryptococcus neoformans/gattii by PCR NOT DETECTED NOTDET      Resistant gene targets        Methicillin Resistance mecA/C  by PCR NOT DETECTED NOTDET      [employer] test comment        False positive results may rarely occur. Correlate with clinical,epidemiologic, and other laboratory findings   Culture, Blood 1    Collection Time: 10/03/24  1:30 PM    Specimen: Blood   Result Value Ref Range    Special Requests NO SPECIAL REQUESTS      Gram Stain Gram positive cocci      Gram Stain ANAEROBIC BOTTLE POSITIVE      Gram Stain        RESULTS VERIFIED, PHONED TO AND READ BACK BY MINOR GROSS RN,[id number],IA    Culture CULTURE IN PROGRESS,FURTHER UPDATES TO FOLLOW      Culture        Refer to Blood Culture ID Panel Accession [id number]   POC CHEM 8    Collection Time: 10/03/24  2:58 PM   Result Value Ref Range

## 2024-10-04 NOTE — PROGRESS NOTES
Student notes are for training only, and should not be used to guide medical decision making.      Medical Student Progress Note   Admit Date:  10/3/2024  2:56 PM   Name:  Carmela Mcgowan   Age:  59 y.o.  Sex:  female  :  1965   MRN:  [id number]   Room:  [facility]/    Presenting Complaint: No chief complaint on file.    Reason(s) for Admission: Stroke-like symptoms [R29.90]     Hospital Course:   Patient is 59 year old female with past medical history of HTN, depression, anxiety, DM presents to ED via EMS after she was found down on the floor by her bed. Per EMS, Sister reports patient had her medication scattered on the floor, unable to identify which medication. ER called code stroke because patient was not moving the right arm as much as her left.  Initial NIHSS was 5. Brain MRI showed no acute ischemia and CTA was unremarkable. EEG was negative for seizures. Neurology signed off.     Upon further questioning, patient reports she wanted to just go to sleep so she \"took bunch of sleeping medication\" she is unable to recall how much and what medication she took. She states medication started with \"hydro\" UDS is pending. Patient reports fall and feeling of dizziness prior to her fall. She had LOC and endorses of right sided shoulder pain. X ray of R shoulder and humerus negative for fracture or dislocation. Patient denies any SI or thoughts of self harm. Psych consult in place and on suicide watch at this time.     Subjective & 24-hour events:  Patient resting during rounds. She denies any chest pain, shortness of breath, nausea, vomiting, abdominal pain. She is unable to move her right arm and shoulder secondary to pain. She has some mild abrasion and soreness on the left lower leg. She denies any thoughts of self harm or SI. She reports feeling more depressed due to her living situation and life stressors. She is from [place], moved to [place] 3 weeks ago and living with her sister because she is waiting on  (36.3 °C), temperature source Oral, resp. rate 16, height 1.702 m (5' 7\"), weight 77.3 kg (170 lb 6.7 oz), SpO2 94%.  General:    Well nourished.    Head:  Normocephalic, atraumatic  Eyes:  Sclerae appear normal.  Pupils equally round.  ENT:  Nares appear normal.  Moist oral mucosa  Neck:  No restricted ROM.  Trachea midline   CV:   RRR.  No m/r/g.  No jugular venous distension.  Lungs:   CTAB.  No wheezing, rhonchi, or rales.  Symmetric expansion.  Abdomen:   Soft, nontender, nondistended.  Extremities: No cyanosis or clubbing.  No edema  Skin:     No rashes and normal coloration.   Warm and dry.  Mild abrasion on the left LE   Neuro:  CN II-XII grossly intact.  Sensation intact.   Psych:  Depressed mood and affect.      Recent Labs:  Recent Results (from the past 48 hour(s))   Culture, Blood, PCR ID Panel    Collection Time: 10/03/24  1:30 PM    Specimen: Blood   Result Value Ref Range    Accession Number [id number]     Enterococcus faecalis by PCR NOT DETECTED NOTDET      Enterococcus faecium by PCR NOT DETECTED NOTDET      Listeria monocytogenes by PCR NOT DETECTED NOTDET      STAPHYLOCOCCUS Detected (A) NOTDET      Staphylococcus Aureus NOT DETECTED NOTDET      Staphylococcus epidermidis by PCR Detected (A) NOTDET      Staphylococcus lugdunensis by PCR NOT DETECTED NOTDET      STREPTOCOCCUS NOT DETECTED NOTDET      Streptococcus agalactiae (Group B) NOT DETECTED NOTDET      Strep pneumoniae NOT DETECTED NOTDET      Strep pyogenes,(Grp. A) NOT DETECTED NOTDET      Acinetobacter calcoac baumannii complex by PCR NOT DETECTED NOTDET      Bacteroides fragilis by PCR NOT DETECTED NOTDET      Enterobacteriaceae by PCR NOT DETECTED NOTDET      Enterobacter cloacae complex by PCR NOT DETECTED NOTDET      Escherichia Coli NOT DETECTED NOTDET      Klebsiella aerogenes by PCR NOT DETECTED NOTDET      Klebsiella oxytoca by PCR NOT DETECTED NOTDET      Klebsiella pneumoniae group by PCR NOT DETECTED NOTDET      Proteus by PCR  MG/DL    Est, Glom Filt Rate >90 >60 ml/min/1.73m2    Calcium 8.7 (L) 8.8 - 10.2 MG/DL   Lipid Panel    Collection Time: 10/04/24  5:52 AM   Result Value Ref Range    Cholesterol, Total 124 0 - 200 MG/DL    Triglycerides 144 0 - 150 MG/DL    HDL 42 40 - 60 MG/DL    LDL Cholesterol 53 0 - 100 MG/DL    VLDL Cholesterol Calculated 29 (H) 6 - 23 MG/DL    Chol/HDL Ratio 3.0 0.0 - 5.0     POCT Glucose    Collection Time: 10/04/24  7:59 AM   Result Value Ref Range    POC Glucose 190 (H) 65 - 100 mg/dL    Performed by: Darian        Current Meds:  Current Facility-Administered Medications   Medication Dose Route Frequency    atorvastatin (LIPITOR) tablet 80 mg  80 mg Oral Nightly    famotidine (PEPCID) 20 mg in sodium chloride (PF) 0.9 % 10 mL injection  20 mg IntraVENous BID    insulin lispro (HUMALOG,ADMELOG) injection vial 0-4 Units  0-4 Units SubCUTAneous TID WC    insulin lispro (HUMALOG,ADMELOG) injection vial 0-4 Units  0-4 Units SubCUTAneous Nightly    losartan (COZAAR) tablet 25 mg  25 mg Oral Daily    insulin glargine (LANTUS) injection vial 5 Units  5 Units SubCUTAneous Nightly    acetaminophen (TYLENOL) tablet 650 mg  650 mg Oral Q6H PRN    ondansetron (ZOFRAN) injection 4 mg  4 mg IntraVENous Q4H PRN    promethazine (PHENERGAN) tablet 25 mg  25 mg Oral Q6H PRN    OLANZapine (ZyPREXA) 5 mg in sterile water 1 mL injection  5 mg IntraMUSCular Q8H PRN    traMADol (ULTRAM) tablet 50 mg  50 mg Oral Q6H PRN    lactated ringers IV soln infusion   IntraVENous Continuous    enoxaparin Sodium (LOVENOX) injection 30 mg  30 mg SubCUTAneous Daily    labetalol (NORMODYNE;TRANDATE) injection 10 mg  10 mg IntraVENous Q6H PRN    thiamine tablet 100 mg  100 mg Oral Daily    folic acid (FOLVITE) tablet 1 mg  1 mg Oral Daily    nicotine (NICODERM CQ) 21 MG/24HR 1 patch  1 patch TransDERmal Daily       Signed:  Ami Changela    Part of this note may have been written by using a voice dictation software.  The note has been

## 2024-10-05 LAB
ALBUMIN SERPL-MCNC: 2.7 G/DL (ref 3.2–4.6)
ALBUMIN SERPL-MCNC: 3.8 G/DL (ref 3.2–4.6)
ALBUMIN/GLOB SERPL: 0.8 (ref 1–1.9)
ALBUMIN/GLOB SERPL: 1.1 (ref 1–1.9)
ALP SERPL-CCNC: 123 U/L (ref 35–104)
ALP SERPL-CCNC: 90 U/L (ref 35–104)
ALT SERPL-CCNC: 31 U/L (ref 8–45)
ALT SERPL-CCNC: 32 U/L (ref 8–45)
AMPHET UR QL SCN: NEGATIVE
ANION GAP SERPL CALC-SCNC: 12 MMOL/L (ref 9–18)
ANION GAP SERPL CALC-SCNC: 22 MMOL/L (ref 9–18)
APAP SERPL-MCNC: <5 UG/ML (ref 10–30)
AST SERPL-CCNC: 104 U/L (ref 15–37)
AST SERPL-CCNC: 83 U/L (ref 15–37)
BACTERIA SPEC CULT: ABNORMAL
BACTERIA SPEC CULT: ABNORMAL
BARBITURATES UR QL SCN: NEGATIVE
BASOPHILS # BLD: 0.1 K/UL (ref 0–0.2)
BASOPHILS NFR BLD: 1 % (ref 0–2)
BENZODIAZ UR QL: NEGATIVE
BILIRUB SERPL-MCNC: 0.3 MG/DL (ref 0–1.2)
BILIRUB SERPL-MCNC: 0.3 MG/DL (ref 0–1.2)
BUN SERPL-MCNC: 11 MG/DL (ref 8–23)
BUN SERPL-MCNC: 12 MG/DL (ref 8–23)
CALCIUM SERPL-MCNC: 8.6 MG/DL (ref 8.8–10.2)
CALCIUM SERPL-MCNC: 9.3 MG/DL (ref 8.8–10.2)
CANNABINOIDS UR QL SCN: NEGATIVE
CHLORIDE SERPL-SCNC: 104 MMOL/L (ref 98–107)
CHLORIDE SERPL-SCNC: 94 MMOL/L (ref 98–107)
CO2 SERPL-SCNC: 16 MMOL/L (ref 20–28)
CO2 SERPL-SCNC: 23 MMOL/L (ref 20–28)
COCAINE UR QL SCN: NEGATIVE
CREAT SERPL-MCNC: 0.66 MG/DL (ref 0.6–1.1)
CREAT SERPL-MCNC: 0.83 MG/DL (ref 0.6–1.1)
DIFFERENTIAL METHOD BLD: NORMAL
EOSINOPHIL # BLD: 0.2 K/UL (ref 0–0.8)
EOSINOPHIL NFR BLD: 2 % (ref 0.5–7.8)
ERYTHROCYTE [DISTWIDTH] IN BLOOD BY AUTOMATED COUNT: 12 % (ref 11.9–14.6)
GLOBULIN SER CALC-MCNC: 3.4 G/DL (ref 2.3–3.5)
GLOBULIN SER CALC-MCNC: 3.6 G/DL (ref 2.3–3.5)
GLUCOSE BLD STRIP.AUTO-MCNC: 171 MG/DL (ref 65–100)
GLUCOSE BLD STRIP.AUTO-MCNC: 182 MG/DL (ref 65–100)
GLUCOSE BLD STRIP.AUTO-MCNC: 188 MG/DL (ref 65–100)
GLUCOSE BLD STRIP.AUTO-MCNC: 217 MG/DL (ref 65–100)
GLUCOSE SERPL-MCNC: 137 MG/DL (ref 70–99)
GLUCOSE SERPL-MCNC: 304 MG/DL (ref 70–99)
GRAM STN SPEC: ABNORMAL
HCT VFR BLD AUTO: 38.6 % (ref 35.8–46.3)
HGB BLD-MCNC: 13.2 G/DL (ref 11.7–15.4)
IMM GRANULOCYTES # BLD AUTO: 0 K/UL (ref 0–0.5)
IMM GRANULOCYTES NFR BLD AUTO: 0 % (ref 0–5)
LYMPHOCYTES # BLD: 3 K/UL (ref 0.5–4.6)
LYMPHOCYTES NFR BLD: 31 % (ref 13–44)
MAGNESIUM SERPL-MCNC: 1.7 MG/DL (ref 1.8–2.4)
MCH RBC QN AUTO: 32 PG (ref 26.1–32.9)
MCHC RBC AUTO-ENTMCNC: 34.2 G/DL (ref 31.4–35)
MCV RBC AUTO: 93.5 FL (ref 82–102)
METHADONE UR QL: NEGATIVE
MONOCYTES # BLD: 0.6 K/UL (ref 0.1–1.3)
MONOCYTES NFR BLD: 6 % (ref 4–12)
NEUTS SEG # BLD: 5.7 K/UL (ref 1.7–8.2)
NEUTS SEG NFR BLD: 60 % (ref 43–78)
NRBC # BLD: 0 K/UL (ref 0–0.2)
OPIATES UR QL: NEGATIVE
PCP UR QL: NEGATIVE
PLATELET # BLD AUTO: 261 K/UL (ref 150–450)
PMV BLD AUTO: 10.2 FL (ref 9.4–12.3)
POTASSIUM SERPL-SCNC: 3.5 MMOL/L (ref 3.5–5.1)
POTASSIUM SERPL-SCNC: ABNORMAL MMOL/L (ref 3.5–5.1)
PROT SERPL-MCNC: 6.1 G/DL (ref 6.3–8.2)
PROT SERPL-MCNC: 7.4 G/DL (ref 6.3–8.2)
RBC # BLD AUTO: 4.13 M/UL (ref 4.05–5.2)
SALICYLATES SERPL-MCNC: <0.5 MG/DL
SERVICE CMNT-IMP: ABNORMAL
SODIUM SERPL-SCNC: 132 MMOL/L (ref 136–145)
SODIUM SERPL-SCNC: 139 MMOL/L (ref 136–145)
WBC # BLD AUTO: 9.5 K/UL (ref 4.3–11.1)

## 2024-10-05 PROCEDURE — 82962 GLUCOSE BLOOD TEST: CPT

## 2024-10-05 PROCEDURE — 80053 COMPREHEN METABOLIC PANEL: CPT

## 2024-10-05 PROCEDURE — 6360000002 HC RX W HCPCS: Performed by: HOSPITALIST

## 2024-10-05 PROCEDURE — 2500000003 HC RX 250 WO HCPCS: Performed by: HOSPITALIST

## 2024-10-05 PROCEDURE — 6370000000 HC RX 637 (ALT 250 FOR IP): Performed by: HOSPITALIST

## 2024-10-05 PROCEDURE — 83735 ASSAY OF MAGNESIUM: CPT

## 2024-10-05 PROCEDURE — 36415 COLL VENOUS BLD VENIPUNCTURE: CPT

## 2024-10-05 PROCEDURE — 51798 US URINE CAPACITY MEASURE: CPT

## 2024-10-05 PROCEDURE — 1100000003 HC PRIVATE W/ TELEMETRY

## 2024-10-05 PROCEDURE — 2580000003 HC RX 258: Performed by: HOSPITALIST

## 2024-10-05 PROCEDURE — 1100000000 HC RM PRIVATE

## 2024-10-05 PROCEDURE — 85025 COMPLETE CBC W/AUTO DIFF WBC: CPT

## 2024-10-05 RX ADMIN — SODIUM CHLORIDE, PRESERVATIVE FREE 20 MG: 5 INJECTION INTRAVENOUS at 21:32

## 2024-10-05 RX ADMIN — LOSARTAN POTASSIUM 25 MG: 25 TABLET, FILM COATED ORAL at 08:08

## 2024-10-05 RX ADMIN — SODIUM CHLORIDE, PRESERVATIVE FREE 20 MG: 5 INJECTION INTRAVENOUS at 08:09

## 2024-10-05 RX ADMIN — TRAMADOL HYDROCHLORIDE 50 MG: 50 TABLET ORAL at 11:17

## 2024-10-05 RX ADMIN — FOLIC ACID 1 MG: 1 TABLET ORAL at 08:08

## 2024-10-05 RX ADMIN — Medication 100 MG: at 08:08

## 2024-10-05 RX ADMIN — INSULIN GLARGINE 5 UNITS: 100 INJECTION, SOLUTION SUBCUTANEOUS at 21:32

## 2024-10-05 RX ADMIN — ATORVASTATIN CALCIUM 80 MG: 80 TABLET, FILM COATED ORAL at 21:32

## 2024-10-05 RX ADMIN — INSULIN LISPRO 1 UNITS: 100 INJECTION, SOLUTION INTRAVENOUS; SUBCUTANEOUS at 17:16

## 2024-10-05 RX ADMIN — TRAMADOL HYDROCHLORIDE 50 MG: 50 TABLET ORAL at 21:37

## 2024-10-05 RX ADMIN — ENOXAPARIN SODIUM 40 MG: 100 INJECTION SUBCUTANEOUS at 12:38

## 2024-10-05 ASSESSMENT — PAIN DESCRIPTION - DESCRIPTORS: DESCRIPTORS: ACHING

## 2024-10-05 ASSESSMENT — PAIN DESCRIPTION - ORIENTATION
ORIENTATION: RIGHT
ORIENTATION: RIGHT

## 2024-10-05 ASSESSMENT — PAIN DESCRIPTION - LOCATION
LOCATION: SHOULDER
LOCATION: SHOULDER

## 2024-10-05 ASSESSMENT — PAIN SCALES - GENERAL
PAINLEVEL_OUTOF10: 0
PAINLEVEL_OUTOF10: 4
PAINLEVEL_OUTOF10: 7
PAINLEVEL_OUTOF10: 7

## 2024-10-05 NOTE — PROGRESS NOTES
Hospitalist Progress Note   Admit Date:  10/3/2024  2:56 PM   Name:  Carmela Mcgowan   Age:  59 y.o.  Sex:  female  :  1965   MRN:  [id number]   Room:  810/    Presenting/Chief Complaint: No chief complaint on file.     Reason(s) for Admission: Stroke-like symptoms [R29.90]     Hospital Course:   Carmela Mcgowan is a 59 y.o. female with medical history of HTN, depression, anxiety, DM presents to ED via EMS after she was found down on the floor by her bed. Per EMS, Sister reports patient had her medication scattered on the floor, unable to identify which medication. ER called code stroke because patient was not moving the right arm as much as her left.  Initial NIHSS was 5. Brain MRI showed no acute ischemia and CTA was unremarkable. EEG was negative for seizures. Neurology signed off.     Upon further questioning, patient reports that she took some sleeping medication that started with hydro.  UDS showed positivity for benzodiazepines.  Patient states that she takes this medication for anxiety and we are suspecting it may likely be hydroxyzine.  Patient felt dizzy prior to her fall but does not remember exact events.      Upon arrival to the floor, she appears to be back at her baseline.  Imaging of shoulders and right arm show no evidence of acute fracture or dislocation.  Currently denies any suicidal or homicidal ideation.  Psych evaluated patient and patient is no longer threat to self.      Subjective & 24hr Events:   Patient was seen and evaluated at bedside this morning.  Much more awake this morning.  Denies any fevers, chills, chest pain, nausea, vomiting.  Feeling well.  Right arm is moving better      Assessment & Plan:     Right arm pain and weakness secondary to fall and trauma  Overdose on medications  Stroke ruled out given negative MRI.  Patient having right arm weakness due to pain from trauma.  X-rays show no acute fracture or dislocation.  PT/OT eval noted and recommending  Absolute 0.0 0.0 - 0.5 K/UL   Urine Drug Screen    Collection Time: 10/04/24 12:06 PM   Result Value Ref Range    Phencyclidine, Urine Negative NEG      Benzodiazepines, Urine Positive (A) NEG      Cocaine, Urine Negative NEG      Amphetamine, Urine Negative NEG      Methadone, Urine Negative NEG      THC, TH-Cannabinol, Urine Negative NEG      Opiates, Urine Negative NEG      Barbiturates, Urine Negative NEG     POCT Glucose    Collection Time: 10/04/24 12:06 PM   Result Value Ref Range    POC Glucose 190 (H) 65 - 100 mg/dL    Performed by: LobitoelyPCT    POCT Glucose    Collection Time: 10/04/24  4:49 PM   Result Value Ref Range    POC Glucose 144 (H) 65 - 100 mg/dL    Performed by: TristonPosh EyesPCT    POCT Glucose    Collection Time: 10/04/24  8:53 PM   Result Value Ref Range    POC Glucose 180 (H) 65 - 100 mg/dL    Performed by: JesússPCT    Comprehensive Metabolic Panel    Collection Time: 10/05/24  5:00 AM   Result Value Ref Range    Sodium 139 136 - 145 mmol/L    Potassium 3.5 3.5 - 5.1 mmol/L    Chloride 104 98 - 107 mmol/L    CO2 23 20 - 28 mmol/L    Anion Gap 12 9 - 18 mmol/L    Glucose 137 (H) 70 - 99 mg/dL    BUN 11 8 - 23 MG/DL    Creatinine 0.66 0.60 - 1.10 MG/DL    Est, Glom Filt Rate >90 >60 ml/min/1.73m2    Calcium 8.6 (L) 8.8 - 10.2 MG/DL    Total Bilirubin 0.3 0.0 - 1.2 MG/DL    ALT 32 8 - 45 U/L    AST 83 (H) 15 - 37 U/L    Alk Phosphatase 90 35 - 104 U/L    Total Protein 6.1 (L) 6.3 - 8.2 g/dL    Albumin 2.7 (L) 3.2 - 4.6 g/dL    Globulin 3.4 2.3 - 3.5 g/dL    Albumin/Globulin Ratio 0.8 (L) 1.0 - 1.9     CBC with Auto Differential    Collection Time: 10/05/24  5:00 AM   Result Value Ref Range    WBC 9.5 4.3 - 11.1 K/uL    RBC 4.13 4.05 - 5.2 M/uL    Hemoglobin 13.2 11.7 - 15.4 g/dL    Hematocrit 38.6 35.8 - 46.3 %    MCV 93.5 82 - 102 FL    MCH 32.0 26.1 - 32.9 PG    MCHC 34.2 31.4 - 35.0 g/dL    RDW 12.0 11.9 - 14.6 %    Platelets 261 150 - 450 K/uL    MPV 10.2 9.4 - 12.3 FL    nRBC  0.00 0.0 - 0.2 K/uL    Differential Type AUTOMATED      Neutrophils % 60 43 - 78 %    Lymphocytes % 31 13 - 44 %    Monocytes % 6 4.0 - 12.0 %    Eosinophils % 2 0.5 - 7.8 %    Basophils % 1 0.0 - 2.0 %    Immature Granulocytes % 0 0.0 - 5.0 %    Neutrophils Absolute 5.7 1.7 - 8.2 K/UL    Lymphocytes Absolute 3.0 0.5 - 4.6 K/UL    Monocytes Absolute 0.6 0.1 - 1.3 K/UL    Eosinophils Absolute 0.2 0.0 - 0.8 K/UL    Basophils Absolute 0.1 0.0 - 0.2 K/UL    Immature Granulocytes Absolute 0.0 0.0 - 0.5 K/UL   Magnesium    Collection Time: 10/05/24  5:00 AM   Result Value Ref Range    Magnesium 1.7 (L) 1.8 - 2.4 mg/dL   POCT Glucose    Collection Time: 10/05/24  7:54 AM   Result Value Ref Range    POC Glucose 182 (H) 65 - 100 mg/dL    Performed by: Darian    POCT Glucose    Collection Time: 10/05/24 12:06 PM   Result Value Ref Range    POC Glucose 171 (H) 65 - 100 mg/dL    Performed by: Darian        Recent Labs     10/04/24  0510   COVID19 NOT DETECTED       Current Meds:  Current Facility-Administered Medications   Medication Dose Route Frequency    atorvastatin (LIPITOR) tablet 80 mg  80 mg Oral Nightly    famotidine (PEPCID) 20 mg in sodium chloride (PF) 0.9 % 10 mL injection  20 mg IntraVENous BID    insulin lispro (HUMALOG,ADMELOG) injection vial 0-4 Units  0-4 Units SubCUTAneous TID WC    insulin lispro (HUMALOG,ADMELOG) injection vial 0-4 Units  0-4 Units SubCUTAneous Nightly    losartan (COZAAR) tablet 25 mg  25 mg Oral Daily    insulin glargine (LANTUS) injection vial 5 Units  5 Units SubCUTAneous Nightly    acetaminophen (TYLENOL) tablet 650 mg  650 mg Oral Q6H PRN    ondansetron (ZOFRAN) injection 4 mg  4 mg IntraVENous Q4H PRN    promethazine (PHENERGAN) tablet 25 mg  25 mg Oral Q6H PRN    OLANZapine (ZyPREXA) 5 mg in sterile water 1 mL injection  5 mg IntraMUSCular Q8H PRN    traMADol (ULTRAM) tablet 50 mg  50 mg Oral Q6H PRN    enoxaparin (LOVENOX) injection 40 mg  40 mg SubCUTAneous Q24H

## 2024-10-05 NOTE — CARE COORDINATION
Pt not a candidate for IRC. She is, however, agreeable to STR; ambulating 80' per PT note on 10/4. CM provided her a [facility] Medicare STR List. Explained the referral and transfer process. Instructed to choose 3-4 facilities and CM will make referrals. Explained that CM will need to get prior auth before sending her out. Pt stated she is on the list for public housing in [place]. She plans to discharge from the facility to her sisters until her housing is available.

## 2024-10-05 NOTE — PROGRESS NOTES
Student notes are for training only, and should not be used to guide medical decision making.      Medical Student Progress Note   Admit Date:  10/3/2024  2:56 PM   Name:  Carmela Mcgowan   Age:  59 y.o.  Sex:  female  :  1965   MRN:  [id number]   Room:  [facility]/    Presenting Complaint: No chief complaint on file.    Reason(s) for Admission: Stroke-like symptoms [R29.90]     Hospital Course:   Patient is 59 year old female with past medical history of HTN, depression, anxiety, DM presents to ED via EMS after she was found down on the floor by her bed. Per EMS, Sister reports patient had her medication scattered on the floor, unable to identify which medication. ER called code stroke because patient was not moving the right arm as much as her left.  Initial NIHSS was 5. Brain MRI showed no acute ischemia and CTA was unremarkable. EEG was negative for seizures. Neurology signed off.      Upon further questioning, patient reports she wanted to just go to sleep so she \"took bunch of sleeping medication\" she is unable to recall how much and what medication she took. She states medication started with \"hydro\" UDS is pending. Patient reports fall and feeling of dizziness prior to her fall. She had LOC and endorses of right sided shoulder pain. X ray of R shoulder and humerus negative for fracture or dislocation. Patient denies any SI or thoughts of self harm. Psych consult in place and on suicide watch at this time.     Subjective & 24-hour events:  Patient resting during rounds. She denies any chest pain, shortness of breath, nausea, vomiting, abdominal pain. She is unable to move her right arm and shoulder secondary to pain. Shoulder pain and movement has improved compared to yesterday. Spoke with sister at bedside. She is requesting patient to have rehab for her ongoing psych and mental health history.     Assessment & Plan:   Dizziness/ Lightheadedness secondary to medication overdose -  Resolved   - MRI  but may still contain some grammatical/other typographical errors.

## 2024-10-05 NOTE — ACP (ADVANCE CARE PLANNING)
Advance Care Planning     Advance Care Planning Inpatient Note  [facility] Department    Today's Date: 10/5/2024  Unit: [facility] 8 MED SURG    Received request from patient.  Upon review of chart and communication with care team, patient's decision making abilities are not in question.. Patient and Child/Children was/were present in the room during visit.    Goals of ACP Conversation:  Discuss advance care planning documents  Facilitate a discussion related to patient's goals of care as they align with the patient's values and beliefs.    Health Care Decision Makers:       Primary Decision Maker: Donavan Crumen - Child - [phone]  Summary:  Completed New Documents    Advance Care Planning Documents (Patient Wishes):  Healthcare Power of /Advance Directive Appointment of Health Care Agent     Assessment:  Shameka Crum is primary decision maker    Interventions:  Provided education on documents for clarity and greater understanding  Discussed and provided education on state decision maker hierarchy  Assisted in the completion of documents according to patient's wishes at this time  Encouraged ongoing ACP conversation with future decision makers and loved ones    Care Preferences Communicated:   No    Outcomes/Plan:  ACP Discussion: Completed  New advance directive completed.  Returned original document(s) to patient, as well as copies for distribution to appointed agents  Copy of advance directive given to staff to scan into medical record.  Teach Back Method used to verify the patient's and/or Healthcare Decision Maker's understanding of key information in the advance directive documents    Electronically signed by KAMLA BENNETT on 10/5/2024 at 12:37 PM

## 2024-10-06 LAB
ALBUMIN SERPL-MCNC: 2.7 G/DL (ref 3.2–4.6)
ALBUMIN/GLOB SERPL: 0.7 (ref 1–1.9)
ALP SERPL-CCNC: 96 U/L (ref 35–104)
ALT SERPL-CCNC: 28 U/L (ref 8–45)
ANION GAP SERPL CALC-SCNC: 10 MMOL/L (ref 9–18)
AST SERPL-CCNC: 51 U/L (ref 15–37)
BILIRUB SERPL-MCNC: 0.4 MG/DL (ref 0–1.2)
BUN SERPL-MCNC: 9 MG/DL (ref 8–23)
CALCIUM SERPL-MCNC: 8.6 MG/DL (ref 8.8–10.2)
CHLORIDE SERPL-SCNC: 103 MMOL/L (ref 98–107)
CO2 SERPL-SCNC: 24 MMOL/L (ref 20–28)
CREAT SERPL-MCNC: 0.69 MG/DL (ref 0.6–1.1)
GLOBULIN SER CALC-MCNC: 3.7 G/DL (ref 2.3–3.5)
GLUCOSE BLD STRIP.AUTO-MCNC: 144 MG/DL (ref 65–100)
GLUCOSE BLD STRIP.AUTO-MCNC: 160 MG/DL (ref 65–100)
GLUCOSE BLD STRIP.AUTO-MCNC: 243 MG/DL (ref 65–100)
GLUCOSE BLD STRIP.AUTO-MCNC: 244 MG/DL (ref 65–100)
GLUCOSE SERPL-MCNC: 176 MG/DL (ref 70–99)
POTASSIUM SERPL-SCNC: 3.6 MMOL/L (ref 3.5–5.1)
PROT SERPL-MCNC: 6.4 G/DL (ref 6.3–8.2)
SERVICE CMNT-IMP: ABNORMAL
SODIUM SERPL-SCNC: 137 MMOL/L (ref 136–145)

## 2024-10-06 PROCEDURE — 6370000000 HC RX 637 (ALT 250 FOR IP)

## 2024-10-06 PROCEDURE — 6370000000 HC RX 637 (ALT 250 FOR IP): Performed by: HOSPITALIST

## 2024-10-06 PROCEDURE — 36415 COLL VENOUS BLD VENIPUNCTURE: CPT

## 2024-10-06 PROCEDURE — 80053 COMPREHEN METABOLIC PANEL: CPT

## 2024-10-06 PROCEDURE — 1100000000 HC RM PRIVATE

## 2024-10-06 PROCEDURE — 6370000000 HC RX 637 (ALT 250 FOR IP): Performed by: FAMILY MEDICINE

## 2024-10-06 PROCEDURE — 6360000002 HC RX W HCPCS: Performed by: HOSPITALIST

## 2024-10-06 PROCEDURE — 82962 GLUCOSE BLOOD TEST: CPT

## 2024-10-06 RX ORDER — BUSPIRONE HYDROCHLORIDE 5 MG/1
5 TABLET ORAL 3 TIMES DAILY
Status: DISCONTINUED | OUTPATIENT
Start: 2024-10-06 | End: 2024-10-08 | Stop reason: HOSPADM

## 2024-10-06 RX ORDER — POLYETHYLENE GLYCOL 3350 17 G/17G
17 POWDER, FOR SOLUTION ORAL DAILY
Status: DISCONTINUED | OUTPATIENT
Start: 2024-10-06 | End: 2024-10-08 | Stop reason: HOSPADM

## 2024-10-06 RX ORDER — FAMOTIDINE 20 MG/1
20 TABLET, FILM COATED ORAL 2 TIMES DAILY
Status: DISCONTINUED | OUTPATIENT
Start: 2024-10-06 | End: 2024-10-08 | Stop reason: HOSPADM

## 2024-10-06 RX ORDER — OXYCODONE HYDROCHLORIDE 5 MG/1
5 TABLET ORAL ONCE
Status: COMPLETED | OUTPATIENT
Start: 2024-10-06 | End: 2024-10-06

## 2024-10-06 RX ADMIN — TRAMADOL HYDROCHLORIDE 50 MG: 50 TABLET ORAL at 16:51

## 2024-10-06 RX ADMIN — Medication 100 MG: at 08:22

## 2024-10-06 RX ADMIN — TRAMADOL HYDROCHLORIDE 50 MG: 50 TABLET ORAL at 23:32

## 2024-10-06 RX ADMIN — INSULIN GLARGINE 5 UNITS: 100 INJECTION, SOLUTION SUBCUTANEOUS at 20:42

## 2024-10-06 RX ADMIN — LOSARTAN POTASSIUM 25 MG: 25 TABLET, FILM COATED ORAL at 08:22

## 2024-10-06 RX ADMIN — INSULIN LISPRO 1 UNITS: 100 INJECTION, SOLUTION INTRAVENOUS; SUBCUTANEOUS at 10:02

## 2024-10-06 RX ADMIN — OXYCODONE HYDROCHLORIDE 5 MG: 5 TABLET ORAL at 20:50

## 2024-10-06 RX ADMIN — FAMOTIDINE 20 MG: 20 TABLET, FILM COATED ORAL at 08:22

## 2024-10-06 RX ADMIN — ENOXAPARIN SODIUM 40 MG: 100 INJECTION SUBCUTANEOUS at 13:02

## 2024-10-06 RX ADMIN — POLYETHYLENE GLYCOL 3350 17 G: 17 POWDER, FOR SOLUTION ORAL at 16:13

## 2024-10-06 RX ADMIN — BUSPIRONE HYDROCHLORIDE 5 MG: 5 TABLET ORAL at 13:02

## 2024-10-06 RX ADMIN — FLUOXETINE HYDROCHLORIDE 40 MG: 20 CAPSULE ORAL at 13:02

## 2024-10-06 RX ADMIN — FOLIC ACID 1 MG: 1 TABLET ORAL at 08:22

## 2024-10-06 ASSESSMENT — PAIN SCALES - GENERAL
PAINLEVEL_OUTOF10: 9
PAINLEVEL_OUTOF10: 7
PAINLEVEL_OUTOF10: 0

## 2024-10-06 ASSESSMENT — PAIN DESCRIPTION - LOCATION
LOCATION: SHOULDER
LOCATION: SHOULDER

## 2024-10-06 ASSESSMENT — PAIN DESCRIPTION - ORIENTATION
ORIENTATION: RIGHT
ORIENTATION: RIGHT

## 2024-10-06 ASSESSMENT — PAIN DESCRIPTION - DESCRIPTORS: DESCRIPTORS: ACHING;TINGLING

## 2024-10-06 ASSESSMENT — PAIN SCALES - WONG BAKER: WONGBAKER_NUMERICALRESPONSE: NO HURT

## 2024-10-06 NOTE — PROGRESS NOTES
Hospitalist Progress Note   Admit Date:  10/3/2024  2:56 PM   Name:  Carmela Mcgowan   Age:  59 y.o.  Sex:  female  :  1965   MRN:  [id number]   Room:  810/    Presenting/Chief Complaint: No chief complaint on file.     Reason(s) for Admission: Stroke-like symptoms [R29.90]     Hospital Course:   Carmela Mcgowan is a 59 y.o. female with medical history of HTN, depression, anxiety, DM presents to ED via EMS after she was found down on the floor by her bed. Per EMS, Sister reports patient had her medication scattered on the floor, unable to identify which medication. ER called code stroke because patient was not moving the right arm as much as her left.  Initial NIHSS was 5. Brain MRI showed no acute ischemia and CTA was unremarkable. EEG was negative for seizures. Neurology signed off.     Upon further questioning, patient reports that she took some sleeping medication that started with hydro.  UDS showed positivity for benzodiazepines.  Patient states that she takes this medication for anxiety and we are suspecting it may likely be hydroxyzine.  Patient felt dizzy prior to her fall but does not remember exact events.      Upon arrival to the floor, she appears to be back at her baseline.  Imaging of shoulders and right arm show no evidence of acute fracture or dislocation.  Currently denies any suicidal or homicidal ideation.  Psych evaluated patient and patient is no longer threat to self.      Subjective & 24hr Events:   Patient seen this morning lying in bed. Says she is feeling well but is having some anxiety. Nothing in particular is making her feel this way. Says she normally takes fluoxetine and Buspar at home. She also takes hydroxyzine as needed for anxiety/panic attacks.       Assessment & Plan:     Right arm pain and weakness secondary to fall and trauma  Overdose on medications  Stroke ruled out given negative MRI.  Patient having right arm weakness due to pain from trauma.  X-rays show  Collection Time: 10/05/24 12:06 PM   Result Value Ref Range    POC Glucose 171 (H) 65 - 100 mg/dL    Performed by: Darian    POCT Glucose    Collection Time: 10/05/24  4:53 PM   Result Value Ref Range    POC Glucose 217 (H) 65 - 100 mg/dL    Performed by: [email or web address]    POCT Glucose    Collection Time: 10/05/24  9:04 PM   Result Value Ref Range    POC Glucose 188 (H) 65 - 100 mg/dL    Performed by: Marry    Comprehensive Metabolic Panel    Collection Time: 10/06/24  6:09 AM   Result Value Ref Range    Sodium 137 136 - 145 mmol/L    Potassium 3.6 3.5 - 5.1 mmol/L    Chloride 103 98 - 107 mmol/L    CO2 24 20 - 28 mmol/L    Anion Gap 10 9 - 18 mmol/L    Glucose 176 (H) 70 - 99 mg/dL    BUN 9 8 - 23 MG/DL    Creatinine 0.69 0.60 - 1.10 MG/DL    Est, Glom Filt Rate >90 >60 ml/min/1.73m2    Calcium 8.6 (L) 8.8 - 10.2 MG/DL    Total Bilirubin 0.4 0.0 - 1.2 MG/DL    ALT 28 8 - 45 U/L    AST 51 (H) 15 - 37 U/L    Alk Phosphatase 96 35 - 104 U/L    Total Protein 6.4 6.3 - 8.2 g/dL    Albumin 2.7 (L) 3.2 - 4.6 g/dL    Globulin 3.7 (H) 2.3 - 3.5 g/dL    Albumin/Globulin Ratio 0.7 (L) 1.0 - 1.9     POCT Glucose    Collection Time: 10/06/24  8:57 AM   Result Value Ref Range    POC Glucose 243 (H) 65 - 100 mg/dL    Performed by: Ernestina        Recent Labs     10/04/24  0510   COVID19 NOT DETECTED       Current Meds:  Current Facility-Administered Medications   Medication Dose Route Frequency    famotidine (PEPCID) tablet 20 mg  20 mg Oral BID    FLUoxetine (PROZAC) capsule 40 mg  40 mg Oral Daily    busPIRone (BUSPAR) tablet 5 mg  5 mg Oral TID    atorvastatin (LIPITOR) tablet 80 mg  80 mg Oral Nightly    insulin lispro (HUMALOG,ADMELOG) injection vial 0-4 Units  0-4 Units SubCUTAneous TID     insulin lispro (HUMALOG,ADMELOG) injection vial 0-4 Units  0-4 Units SubCUTAneous Nightly    losartan (COZAAR) tablet 25 mg  25 mg Oral Daily    insulin glargine (LANTUS) injection vial 5 Units  5 Units

## 2024-10-06 NOTE — PROGRESS NOTES
Advance Care Planning     Advance Care Planning Inpatient Note  [facility] Department    Today's Date: 10/6/2024  Unit: SFD 8 MED SURG    Received request from patient.  Upon review of chart and communication with care team, patient's decision making abilities are not in question.. Patient and Child/Children was/were present in the room during visit.    Goals of ACP Conversation:  Discuss advance care planning documents  Facilitate a discussion related to patient's goals of care as they align with the patient's values and beliefs.    Health Care Decision Makers:       Primary Decision Maker: Shameka Crum - Child - [phone]  Summary:  Completed New Documents    Advance Care Planning Documents (Patient Wishes):  Healthcare Power of /Advance Directive Appointment of Health Care Agent     Assessment:  Shameka Crum is listed as primary decision maker    Interventions:  Provided education on documents for clarity and greater understanding  Discussed and provided education on state decision maker hierarchy  Assisted in the completion of documents according to patient's wishes at this time  Encouraged ongoing ACP conversation with future decision makers and loved ones    Care Preferences Communicated:   No    Outcomes/Plan:  ACP Discussion: Completed  New advance directive completed.  Returned original document(s) to patient, as well as copies for distribution to appointed agents  Copy of advance directive given to staff to scan into medical record.  Teach Back Method used to verify the patient's and/or Healthcare Decision Maker's understanding of key information in the advance directive documents    Electronically signed by Chaplain DONALD on 10/6/2024 at 4:37 PM

## 2024-10-07 LAB
GLUCOSE BLD STRIP.AUTO-MCNC: 159 MG/DL (ref 65–100)
GLUCOSE BLD STRIP.AUTO-MCNC: 171 MG/DL (ref 65–100)
GLUCOSE BLD STRIP.AUTO-MCNC: 217 MG/DL (ref 65–100)
GLUCOSE BLD STRIP.AUTO-MCNC: 231 MG/DL (ref 65–100)
GLUCOSE BLD STRIP.AUTO-MCNC: 318 MG/DL (ref 65–100)
SERVICE CMNT-IMP: ABNORMAL

## 2024-10-07 PROCEDURE — 1100000000 HC RM PRIVATE

## 2024-10-07 PROCEDURE — 6370000000 HC RX 637 (ALT 250 FOR IP)

## 2024-10-07 PROCEDURE — 6360000002 HC RX W HCPCS: Performed by: HOSPITALIST

## 2024-10-07 PROCEDURE — 6370000000 HC RX 637 (ALT 250 FOR IP): Performed by: HOSPITALIST

## 2024-10-07 PROCEDURE — 97530 THERAPEUTIC ACTIVITIES: CPT

## 2024-10-07 RX ORDER — FOLIC ACID 1 MG/1
1 TABLET ORAL DAILY
Qty: 30 TABLET | Refills: 3 | Status: SHIPPED | OUTPATIENT
Start: 2024-10-08

## 2024-10-07 RX ORDER — LANOLIN ALCOHOL/MO/W.PET/CERES
100 CREAM (GRAM) TOPICAL DAILY
Qty: 30 TABLET | Refills: 3 | Status: SHIPPED | OUTPATIENT
Start: 2024-10-08

## 2024-10-07 RX ADMIN — INSULIN GLARGINE 5 UNITS: 100 INJECTION, SOLUTION SUBCUTANEOUS at 20:24

## 2024-10-07 RX ADMIN — FAMOTIDINE 20 MG: 20 TABLET, FILM COATED ORAL at 08:54

## 2024-10-07 RX ADMIN — FLUOXETINE HYDROCHLORIDE 40 MG: 20 CAPSULE ORAL at 08:54

## 2024-10-07 RX ADMIN — BUSPIRONE HYDROCHLORIDE 5 MG: 5 TABLET ORAL at 08:54

## 2024-10-07 RX ADMIN — ATORVASTATIN CALCIUM 80 MG: 80 TABLET, FILM COATED ORAL at 20:24

## 2024-10-07 RX ADMIN — BUSPIRONE HYDROCHLORIDE 5 MG: 5 TABLET ORAL at 13:54

## 2024-10-07 RX ADMIN — POLYETHYLENE GLYCOL 3350 17 G: 17 POWDER, FOR SOLUTION ORAL at 08:53

## 2024-10-07 RX ADMIN — Medication 100 MG: at 08:54

## 2024-10-07 RX ADMIN — FAMOTIDINE 20 MG: 20 TABLET, FILM COATED ORAL at 20:25

## 2024-10-07 RX ADMIN — FOLIC ACID 1 MG: 1 TABLET ORAL at 08:54

## 2024-10-07 RX ADMIN — LOSARTAN POTASSIUM 25 MG: 25 TABLET, FILM COATED ORAL at 08:54

## 2024-10-07 RX ADMIN — ENOXAPARIN SODIUM 40 MG: 100 INJECTION SUBCUTANEOUS at 13:54

## 2024-10-07 RX ADMIN — TRAMADOL HYDROCHLORIDE 50 MG: 50 TABLET ORAL at 23:33

## 2024-10-07 RX ADMIN — BUSPIRONE HYDROCHLORIDE 5 MG: 5 TABLET ORAL at 20:24

## 2024-10-07 RX ADMIN — INSULIN LISPRO 1 UNITS: 100 INJECTION, SOLUTION INTRAVENOUS; SUBCUTANEOUS at 11:57

## 2024-10-07 ASSESSMENT — PAIN SCALES - GENERAL
PAINLEVEL_OUTOF10: 0
PAINLEVEL_OUTOF10: 7

## 2024-10-07 ASSESSMENT — PAIN DESCRIPTION - ORIENTATION: ORIENTATION: RIGHT

## 2024-10-07 ASSESSMENT — PAIN DESCRIPTION - DESCRIPTORS: DESCRIPTORS: ACHING

## 2024-10-07 ASSESSMENT — PAIN DESCRIPTION - LOCATION: LOCATION: ARM

## 2024-10-07 NOTE — PROGRESS NOTES
Pt resting in bed. Respirations even and unlabored on RA. No s/s of distress. Call light within reach. Preparing to give report to oncoming RN.      Right shoulder pain reported. Pt reported tramadol is effective but does not last long. On call MD notified and one time dose oxycodone 5 mg PO ordered and administered. Pt reported more satisfaction with oxycodone added for pain management.     Asked if pt would like ordered PRN tramadol this morning. Pt reports shoulder is feeling much better and refused pain medication.     No acute events overnight.

## 2024-10-07 NOTE — CARE COORDINATION
MSN, CM:  patient told MD this AM she was not going to rehab.  When patient was to get discharged patient changed her mind stating \"My sister said I needed to go to rehab, so I will\".  [facility] requested [facility] accepted.  Auth to be placed today when PT/OT notes available.  Case Management will continue to follow.

## 2024-10-07 NOTE — PROGRESS NOTES
ACUTE PHYSICAL THERAPY GOALS:   (Developed with and agreed upon by patient and/or caregiver.)  (1.) Carmela Mcgowan  will move from supine to sit and sit to supine  and scoot up and down with INDEPENDENCE within 7 treatment day(s).    (2.) Carmela Mcgowan will transfer from bed to chair and chair to bed with MODIFIED INDEPENDENCE using the least restrictive device within 7 treatment day(s).    (3.) Carmela Mcgowan will ambulate with MODIFIED INDEPENDENCE for 500 feet with the least restrictive device within 7 treatment day(s).   (4.) Carmela Mcgowan will perform standing static and dynamic balance activities x 10 minutes with MODIFIED INDEPENDENCE to improve safety within 7 treatment day(s).  (5.) Carmela Mcgowan will ascend and descend flight of stairs using 1 hand rail(s) with MODIFIED INDEPENDENCE to improve functional mobility and safety within 7 treatment day(s).    PHYSICAL THERAPY: Daily Note PM   (Link to Caseload Tracking: PT Visit Days : 2  Time In/Out PT Charge Capture  Rehab Caseload Tracker  Orders    Carmela Mcgowan is a 59 y.o. female   PRIMARY DIAGNOSIS: Stroke-like symptoms  Stroke-like symptoms [R29.90]       Inpatient: Payor: [facility] MEDICARE / Plan: [facility] MEDICARE COMPLETE / Product Type: *No Product type* /     ASSESSMENT:     REHAB RECOMMENDATIONS:   Recommendation to date pending progress:  Setting:  Inpatient Rehab Facility    Equipment:    To Be Determined     ASSESSMENT:  Ms. Mcgowan was side lying R on contact and agreeable to work with therapy. The PT session today focused on therapeutic activities including bed mobility, sitting and standing balance static and dynamic as well as amb using no AD with close SBA. Pt did well with amb with no LOB amb 80'. With standing dynamic ex she was unsteady but no LOB requring close SBA to CGA.  Progress toward goals demonstrated by increasing   Pt still functioning below their baseline with deficits in    and will benefit from physical therapy.  .      SUBJECTIVE:   Ms. Mcgowan states \"I'm from [place] but moving down here with my sister\".     Social/Functional Lives With: Family  Type of Home: House  Home Layout: Two level  Home Access: Stairs to enter with rails  Entrance Stairs - Number of Steps: 2  Entrance Stairs - Rails: Left  Bathroom Shower/Tub: Tub/Shower unit  Bathroom Toilet: Standard  Bathroom Equipment: None  Bathroom Accessibility: Accessible  Home Equipment: None  Receives Help From: Family  ADL Assistance: Independent  Homemaking Assistance: Independent  Homemaking Responsibilities: Yes  Ambulation Assistance: Independent  Transfer Assistance: Independent  Active : Yes  Mode of Transportation: Car  Education: na  Occupation: On disability  Type of Occupation: na  OBJECTIVE:     PAIN: VITALS / O2: PRECAUTION / LINES / DRAINS:   Pre Treatment: 0         Post Treatment: 0 Vitals        Oxygen    None    RESTRICTIONS/PRECAUTIONS:  Restrictions/Precautions  Restrictions/Precautions: Fall Risk  Restrictions/Precautions: Fall Risk     MOBILITY:   I Mod I S SBA CGA Min Mod Max Total  NT x2 Comments:   Bed Mobility    Rolling [] [] [] [x] [] [] [] [] [] [] []    Supine to Sit [] [] [] [x] [] [] [] [] [] [] []    Scooting [] [] [] [x] [] [] [] [] [] [] []    Sit to Supine [] [] [] [] [] [] [] [] [] [] []    Transfers    Sit to Stand [] [] [] [x] [] [] [] [] [] [] []    Bed to Chair [] [] [] [x] [] [] [] [] [] [] []    Stand to Sit [] [] [] [x] [] [] [] [] [] [] []     [] [] [] [] [] [] [] [] [] [] []    I=Independent, Mod I=Modified Independent, S=Supervision, SBA=Standby Assistance, CGA=Contact Guard Assistance,   Min=Minimal Assistance, Mod=Moderate Assistance, Max=Maximal Assistance, Total=Total Assistance, NT=Not Tested    BALANCE: Good Fair+ Fair Fair- Poor NT Comments   Sitting Static [x] [] [] [] [] []    Sitting Dynamic [] [x] [] [] [] []              Standing Static [] [x] [] [] [] []    Standing Dynamic [] [] [x] [] [] []      GAIT: I Mod

## 2024-10-07 NOTE — PROGRESS NOTES
Hospitalist Progress Note   Admit Date:  10/3/2024  2:56 PM   Name:  Carmela Mcgowan   Age:  59 y.o.  Sex:  female  :  1965   MRN:  [id number]   Room:  810/    Presenting/Chief Complaint: No chief complaint on file.     Reason(s) for Admission: Stroke-like symptoms [R29.90]     Hospital Course:   Carmela Mcgowan is a 59 y.o. female with medical history of HTN, depression, anxiety, DM presents to ED via EMS after she was found down on the floor by her bed. Per EMS, Sister reports patient had her medication scattered on the floor, unable to identify which medication. ER called code stroke because patient was not moving the right arm as much as her left.  Initial NIHSS was 5. Brain MRI showed no acute ischemia and CTA was unremarkable. EEG was negative for seizures. Neurology signed off.     Upon further questioning, patient reports that she took some sleeping medication that started with hydro.  UDS showed positivity for benzodiazepines.  Patient states that she takes this medication for anxiety and we are suspecting it may likely be hydroxyzine.  Patient felt dizzy prior to her fall but does not remember exact events.      Upon arrival to the floor, she appears to be back at her baseline.  Imaging of shoulders and right arm show no evidence of acute fracture or dislocation.  Currently denies any suicidal or homicidal ideation.  Psych evaluated patient and patient is no longer threat to self.      Subjective & 24hr Events:   Patient seen this morning lying in bed. Says she does not want to go to rehab and wants to go home. I discussed the risks with her including falls and even death. However, she wanted to go home still.    Update: Patient now wants to go to rehab. Discharge order canceled and DC summary deleted.     Assessment & Plan:     Right arm pain and weakness secondary to fall and trauma  Overdose on medications  Stroke ruled out given negative MRI.  Patient having right arm weakness due to     Total Bilirubin 0.4 0.0 - 1.2 MG/DL    ALT 28 8 - 45 U/L    AST 51 (H) 15 - 37 U/L    Alk Phosphatase 96 35 - 104 U/L    Total Protein 6.4 6.3 - 8.2 g/dL    Albumin 2.7 (L) 3.2 - 4.6 g/dL    Globulin 3.7 (H) 2.3 - 3.5 g/dL    Albumin/Globulin Ratio 0.7 (L) 1.0 - 1.9     POCT Glucose    Collection Time: 10/06/24  8:57 AM   Result Value Ref Range    POC Glucose 243 (H) 65 - 100 mg/dL    Performed by: EstertonAmbanita    POCT Glucose    Collection Time: 10/06/24 12:14 PM   Result Value Ref Range    POC Glucose 144 (H) 65 - 100 mg/dL    Performed by: Ernestina    POCT Glucose    Collection Time: 10/06/24  4:42 PM   Result Value Ref Range    POC Glucose 160 (H) 65 - 100 mg/dL    Performed by: StaceyAmbanita    POCT Glucose    Collection Time: 10/06/24  8:42 PM   Result Value Ref Range    POC Glucose 244 (H) 65 - 100 mg/dL    Performed by: Marry    POCT Glucose    Collection Time: 10/07/24  7:42 AM   Result Value Ref Range    POC Glucose 159 (H) 65 - 100 mg/dL    Performed by: Felix    POCT Glucose    Collection Time: 10/07/24 11:20 AM   Result Value Ref Range    POC Glucose 217 (H) 65 - 100 mg/dL    Performed by: Lexx        No results for input(s): \"COVID19\" in the last 72 hours.      Current Meds:  Current Facility-Administered Medications   Medication Dose Route Frequency    famotidine (PEPCID) tablet 20 mg  20 mg Oral BID    FLUoxetine (PROZAC) capsule 40 mg  40 mg Oral Daily    busPIRone (BUSPAR) tablet 5 mg  5 mg Oral TID    polyethylene glycol (GLYCOLAX) packet 17 g  17 g Oral Daily    atorvastatin (LIPITOR) tablet 80 mg  80 mg Oral Nightly    insulin lispro (HUMALOG,ADMELOG) injection vial 0-4 Units  0-4 Units SubCUTAneous TID WC    insulin lispro (HUMALOG,ADMELOG) injection vial 0-4 Units  0-4 Units SubCUTAneous Nightly    losartan (COZAAR) tablet 25 mg  25 mg Oral Daily    insulin glargine (LANTUS) injection vial 5 Units  5 Units SubCUTAneous Nightly    acetaminophen (TYLENOL)

## 2024-10-07 NOTE — DISCHARGE SUMMARY
Take 1 capsule by mouth daily      FLUoxetine (PROZAC) 20 MG capsule Take 2 capsules by mouth daily       !! - Potential duplicate medications found. Please discuss with provider.        STOP taking these medications       hydrOXYzine HCl (ATARAX) 25 MG tablet Comments:   Reason for Stopping:               Some of the medications may be marked as \"stop taking\" by the system; but in reality patient or family reported already being off these meds; defer to outpatient/prescribing providers.      Procedures done this admission:  * No surgery found *    Consults this admission:  IP CONSULT TO NEUROLOGY  IP CONSULT TO PSYCHIATRY  IP CONSULT TO PHYSICAL MEDICINE REHAB  IP CONSULT TO SPIRITUAL SERVICES    Consultants will arrange their own follow ups, if applicable.    Echocardiogram results:  No results found for this or any previous visit.      Diagnostic Imaging/Tests:   CTA HEAD NECK W CONTRAST    Result Date: 10/4/2024  Limited evaluation due to motion artifact. No acute intracranial findings. Negative CTA head and neck. No aneurysm, high grade stenosis or occlusion.  Internal carotid artery origin stenosis by NASCET criteria:  No significant stenosis.  Electronically signed by Monico Jean    CT HEAD WO CONTRAST    Result Date: 10/4/2024  No CT evidence of acute intracranial abnormality. Electronically signed by Monico Jean    XR SHOULDER RIGHT (MIN 2 VIEWS)    Result Date: 10/4/2024  Negative right shoulder Electronically signed by BYRON GONZALEZ    XR HUMERUS RIGHT (MIN 2 VIEWS)    Result Date: 10/4/2024  Mild AC joint arthrosis. No other abnormality. Electronically signed by Roland Watson       Labs: Results:       BMP, Mg, Phos Recent Labs     10/05/24  0500 10/06/24  0609    137   K 3.5 3.6    103   CO2 23 24   ANIONGAP 12 10   BUN 11 9   CREATININE 0.66 0.69   LABGLOM >90 >90   CALCIUM 8.6* 8.6*   GLUCOSE 137* 176*   MG 1.7*  --       CBC Recent Labs     10/04/24  1130 10/05/24  0500   WBC 12.5*  DETECTED        Serratia marcescens by PCR NOT DETECTED        Haemophilus Influenzae by PCR NOT DETECTED        Neisseria meningitidis by PCR NOT DETECTED        Pseudomonas aeruginosa NOT DETECTED        Stenotrophomonas maltophilia by PCR NOT DETECTED        Candida albicans by PCR NOT DETECTED        Candida auris by PCR NOT DETECTED        Candida glabrata NOT DETECTED        Candida krusei by PCR NOT DETECTED        Candida parapsilosis by PCR NOT DETECTED        Candida tropicalis by PCR NOT DETECTED        Cryptococcus neoformans/gattii by PCR NOT DETECTED        Resistant gene targets          Methicillin Resistance mecA/C  by PCR NOT DETECTED        Biofire test comment       False positive results may rarely occur. Correlate with clinical,epidemiologic, and other laboratory findings           Comment: Please see BCID Interpretation Guide in EPIC Links       Culture, Blood 1 [[id number]] Collected: 10/03/24 1330    Order Status: Canceled     Culture, Blood 1 [[id number]] Collected: 10/03/24 1330    Order Status: Canceled     Culture, Blood 1 [[id number]]  (Abnormal) Collected: 10/03/24 1330    Order Status: Completed Specimen: Blood Updated: 10/05/24 0654     Special Requests NO SPECIAL REQUESTS        Gram Stain Gram positive cocci         ANAEROBIC BOTTLE POSITIVE               RESULTS VERIFIED, PHONED TO AND READ BACK BY MINOR GROSS RN,[id number],IA           Culture       STAPHYLOCOCCUS SPECIES, COAGULASE NEGATIVE This organism may be indicative of culture contamination. Clinical correlation needs to be evaluated as each case is unique.                  Refer to Blood Culture ID Panel Accession [id number]                  All Labs from Last 24 Hrs:  Recent Results (from the past 24 hour(s))   POCT Glucose    Collection Time: 10/06/24 12:14 PM   Result Value Ref Range    POC Glucose 144 (H) 65 - 100 mg/dL    Performed by: Ernestina    POCT Glucose    Collection Time: 10/06/24  4:42 PM   Result Value Ref

## 2024-10-08 VITALS
OXYGEN SATURATION: 92 % | RESPIRATION RATE: 18 BRPM | WEIGHT: 170.42 LBS | HEART RATE: 74 BPM | HEIGHT: 67 IN | SYSTOLIC BLOOD PRESSURE: 104 MMHG | BODY MASS INDEX: 26.75 KG/M2 | TEMPERATURE: 98.6 F | DIASTOLIC BLOOD PRESSURE: 67 MMHG

## 2024-10-08 PROBLEM — T50.901A DRUG OVERDOSE, ACCIDENTAL OR UNINTENTIONAL, INITIAL ENCOUNTER: Status: ACTIVE | Noted: 2024-10-03

## 2024-10-08 LAB
GLUCOSE BLD STRIP.AUTO-MCNC: 212 MG/DL (ref 65–100)
SERVICE CMNT-IMP: ABNORMAL

## 2024-10-08 PROCEDURE — 6370000000 HC RX 637 (ALT 250 FOR IP)

## 2024-10-08 PROCEDURE — 82962 GLUCOSE BLOOD TEST: CPT

## 2024-10-08 PROCEDURE — 6370000000 HC RX 637 (ALT 250 FOR IP): Performed by: HOSPITALIST

## 2024-10-08 RX ADMIN — Medication 100 MG: at 08:34

## 2024-10-08 RX ADMIN — INSULIN LISPRO 1 UNITS: 100 INJECTION, SOLUTION INTRAVENOUS; SUBCUTANEOUS at 08:00

## 2024-10-08 RX ADMIN — LOSARTAN POTASSIUM 25 MG: 25 TABLET, FILM COATED ORAL at 08:34

## 2024-10-08 RX ADMIN — FAMOTIDINE 20 MG: 20 TABLET, FILM COATED ORAL at 08:34

## 2024-10-08 RX ADMIN — FLUOXETINE HYDROCHLORIDE 40 MG: 20 CAPSULE ORAL at 08:36

## 2024-10-08 RX ADMIN — BUSPIRONE HYDROCHLORIDE 5 MG: 5 TABLET ORAL at 08:36

## 2024-10-08 RX ADMIN — FOLIC ACID 1 MG: 1 TABLET ORAL at 08:33

## 2024-10-08 ASSESSMENT — PAIN SCALES - GENERAL: PAINLEVEL_OUTOF10: 3

## 2024-10-08 NOTE — DISCHARGE SUMMARY
Hospitalist Discharge Summary   Admit Date:  10/3/2024  2:56 PM   DC Note date: 10/8/2024  Name:  Carmela Mcgowan   Age:  59 y.o.  Sex:  female  :  1965   MRN:  [id number]   Room:  [facility]  PCP:  No primary care provider on file.    Presenting Complaint: No chief complaint on file.     Initial Admission Diagnosis: Stroke-like symptoms [R29.90]     Problem List for this Hospitalization (present on admission):    Principal Problem:    Drug overdose, accidental or unintentional, initial encounter  Active Problems:    Depression    Anxiety    Primary hypertension    Hyperlipidemia    Type 2 diabetes mellitus, without long-term current use of insulin (HCC)  Resolved Problems:    * No resolved hospital problems. *      Hospital Course:  59-year-old female with medical history including anxiety and depression presented to [facility] department via EMS after which she was found down on the floor beside her bed.  Her sister reported that upon finding patient's on the floor it was also noted that medication was scheduled for and she was unable to identify which medication this was.  Initially this was billed as a possible stroke because she was not moving her right arm is much as her left.  After presentation she reported that she just wanted to go to sleep so she took a bunch of sleeping medication, stating she did not recall how much and of which medication she took.  Ultimately this was determined to be hydroxyzine.  Notably she moved to the [place] area 3 weeks prior to presentation from [place] to live with her sister.    Workup in the ER presentation was negative for evidence of stroke, including CT head, CTA head and neck, MRI brain.  UDS was negative.  Imaging of the right shoulder is negative for fracture or dislocation.  She was seen in consultation by psychiatry and per that consultants the patient did not require inpatient psychiatric admission.  Her home medications were resumed (except  Methicillin Resistance mecA/C  by PCR NOT DETECTED        Biofire test comment       False positive results may rarely occur. Correlate with clinical,epidemiologic, and other laboratory findings           Comment: Please see BCID Interpretation Guide in EPIC Links       Culture, Blood 1 [[id number]] Collected: 10/03/24 1330    Order Status: Canceled     Culture, Blood 1 [[id number]] Collected: 10/03/24 1330    Order Status: Canceled     Culture, Blood 1 [[id number]]  (Abnormal) Collected: 10/03/24 1330    Order Status: Completed Specimen: Blood Updated: 10/05/24 0654     Special Requests NO SPECIAL REQUESTS        Gram Stain Gram positive cocci         ANAEROBIC BOTTLE POSITIVE               RESULTS VERIFIED, PHONED TO AND READ BACK BY MINOR GROSS RN,[id number],IA           Culture       STAPHYLOCOCCUS SPECIES, COAGULASE NEGATIVE This organism may be indicative of culture contamination. Clinical correlation needs to be evaluated as each case is unique.                  Refer to Blood Culture ID Panel Accession [id number]                  All Labs from Last 24 Hrs:  Recent Results (from the past 24 hour(s))   POCT Glucose    Collection Time: 10/07/24 11:20 AM   Result Value Ref Range    POC Glucose 217 (H) 65 - 100 mg/dL    Performed by: Lexx    POCT Glucose    Collection Time: 10/07/24  4:20 PM   Result Value Ref Range    POC Glucose 171 (H) 65 - 100 mg/dL    Performed by: Noah    POCT Glucose    Collection Time: 10/07/24  8:22 PM   Result Value Ref Range    POC Glucose 231 (H) 65 - 100 mg/dL    Performed by: Glen    POCT Glucose    Collection Time: 10/08/24  7:47 AM   Result Value Ref Range    POC Glucose 212 (H) 65 - 100 mg/dL    Performed by: Judah        No results for input(s): \"COVID19\" in the last 72 hours.    Recent Vital Data:  Patient Vitals for the past 24 hrs:   Temp Pulse Resp BP SpO2   10/08/24 0747 98.6 °F (37 °C) 74 18 104/67 92 %   10/08/24 0355 98.4 °F (36.9 °C) 73

## 2024-10-08 NOTE — PROGRESS NOTES
TRANSFER - OUT REPORT:    Verbal report given to TAYLOR Acevedo on Carmela Mcgowan  being transferred to [place] for routine progression of patient care       Report consisted of patient's Situation, Background, Assessment and   Recommendations(SBAR).     Information from the following report(s) Nurse Handoff Report, Index, ED Encounter Summary, Adult Overview, Intake/Output, MAR, Recent Results, Pre Procedure Checklist, Procedure Verification, and Quality Measures was reviewed with the receiving nurse.           Lines:       Opportunity for questions and clarification was provided.      Patient transported with:  Monitor

## 2024-10-08 NOTE — PROGRESS NOTES
Patient leaving the floor on room air being transported by EMS. Patient care is concluded at this time. No further acute needs noted at time of discharge.

## 2024-10-08 NOTE — CARE COORDINATION
MSN, CM:  patient to be discharged to [facility] today for rehab services.  Patient and family agree with this discharge plan.  Patient has met all milestones for this admission.  Patient to call family to inform of transfer today.  [facility] to transport patient to facility.       10/08/24 1144   Service Assessment   Patient Orientation Alert and Oriented   Cognition Alert   Services At/After Discharge   Transition of Care Consult (CM Consult) SNF  ([facility])   Partner SNF Yes   Services At/After Discharge Skilled Nursing Facility (SNF)   Mode of Transport at Discharge S  ([facility]   [facility] 1130   Confirm Follow Up Transport Family   Condition of Participation: Discharge Planning   The Plan for Transition of Care is related to the following treatment goals: short term rehab   The Patient and/or Patient Representative was provided with a Choice of Provider? Patient;Patient Representative   Name of the Patient Representative who was provided with the Choice of Provider and agrees with the Discharge Plan?  Sister   The Patient and/Or Patient Representative agree with the Discharge Plan? Yes   Freedom of Choice list was provided with basic dialogue that supports the patient's individualized plan of care/goals, treatment preferences, and shares the quality data associated with the providers?  Yes

## 2024-10-08 NOTE — PROGRESS NOTES
Discharge education had been review with patient. Understanding of all discharge instructions both written and verbal have been expressed by  patient.  Discharge medications reviewed with the patient and appropriate educational materials and side effects teaching were provided in written form.     All new medications have been sent to patient's pharmacy of record.    All PIV's removed with dressing applied.    Rn attempted to call report to [facility] to give SBARout. Phone number left for return call. Patient care is ongoing.

## 2024-11-05 NOTE — PROGRESS NOTES
Physician Progress Note      PATIENT:               JACOB MENEZES  [facility] #:                  [id number]  :                       1965  ADMIT DATE:       10/3/2024 2:56 PM  DISCH DATE:        10/8/2024 10:50 AM  RESPONDING  PROVIDER #:        Avila Shultz MD          QUERY TEXT:    Patient admitted with Stroke-like symptoms. Documentation reflects Acute   metabolic encephalopathy in note(s) dated 10/3 ED.  If possible, please   document in the progress notes and discharge summary if Acute metabolic   encephalopathy was:    The medical record reflects the following:  Risk Factors: anxiety, alcohol abuse, type 2 diabetes mellitus, hypertension  Clinical Indicators: ED Provider Notes by Sajan Gonzalez Jr., MD at   10/3/2024  9:21 AM, \"presents with family members with report of altered   mental status; last seen at baseline around 9:30 PM last night. Patient found   minimally responsive beside her bed; confused with abnormal speech. Code S   called; NIHSS 5\"  Treatment: CT head, CTA, UA, IVFs, Neuro eval  Options provided:  -- Acute metabolic encephalopathy confirmed after study  -- Acute metabolic encephalopathy treated and resolved  -- Acute metabolic encephalopathy ruled out after study  -- Other - I will add my own diagnosis  -- Disagree - Not applicable / Not valid  -- Disagree - Clinically unable to determine / Unknown  -- Refer to Clinical Documentation Reviewer    PROVIDER RESPONSE TEXT:    Toxic encephalopathy secondary to excess hydroxyzine use.    Query created by: Mey Heaton on 10/30/2024 1:37 PM      Electronically signed by:  Avila Shultz MD 2024 6:37 AM